# Patient Record
Sex: FEMALE | Race: OTHER | HISPANIC OR LATINO | Employment: UNEMPLOYED | URBAN - METROPOLITAN AREA
[De-identification: names, ages, dates, MRNs, and addresses within clinical notes are randomized per-mention and may not be internally consistent; named-entity substitution may affect disease eponyms.]

---

## 2023-08-18 LAB — CHORIOGONADOTROPIN (MIU/ML) IN SER/PLAS: 1637 MIU/ML

## 2023-08-21 LAB — CHORIOGONADOTROPIN (MIU/ML) IN SER/PLAS: 1944 MIU/ML

## 2023-08-30 LAB — CHORIOGONADOTROPIN (MIU/ML) IN SER/PLAS: 215 MIU/ML

## 2023-09-07 LAB — CHORIOGONADOTROPIN (MIU/ML) IN SER/PLAS: 130 MIU/ML

## 2023-09-20 LAB — CHORIOGONADOTROPIN (MIU/ML) IN SER/PLAS: 27 MIU/ML

## 2024-02-26 ENCOUNTER — OFFICE VISIT (OUTPATIENT)
Dept: OBSTETRICS AND GYNECOLOGY | Facility: CLINIC | Age: 44
End: 2024-02-26
Payer: COMMERCIAL

## 2024-02-26 ENCOUNTER — LAB (OUTPATIENT)
Dept: LAB | Facility: LAB | Age: 44
End: 2024-02-26
Payer: COMMERCIAL

## 2024-02-26 VITALS
HEIGHT: 67 IN | SYSTOLIC BLOOD PRESSURE: 110 MMHG | WEIGHT: 215 LBS | DIASTOLIC BLOOD PRESSURE: 76 MMHG | BODY MASS INDEX: 33.74 KG/M2

## 2024-02-26 DIAGNOSIS — Z34.91 FIRST TRIMESTER PREGNANCY (HHS-HCC): ICD-10-CM

## 2024-02-26 DIAGNOSIS — O36.80X0 ULTRASOUND SCAN TO CONFIRM FETAL VIABILITY WITH HISTORY OF MISCARRIAGE (HHS-HCC): ICD-10-CM

## 2024-02-26 DIAGNOSIS — Z87.59 ULTRASOUND SCAN TO CONFIRM FETAL VIABILITY WITH HISTORY OF MISCARRIAGE (HHS-HCC): ICD-10-CM

## 2024-02-26 DIAGNOSIS — O09.521 AMA (ADVANCED MATERNAL AGE) MULTIGRAVIDA 35+, FIRST TRIMESTER (HHS-HCC): ICD-10-CM

## 2024-02-26 DIAGNOSIS — Z32.01 PREGNANCY TEST POSITIVE (HHS-HCC): ICD-10-CM

## 2024-02-26 DIAGNOSIS — N91.1 AMENORRHEA, SECONDARY: Primary | ICD-10-CM

## 2024-02-26 LAB
ABO GROUP (TYPE) IN BLOOD: NORMAL
ANTIBODY SCREEN: NORMAL
CRL: 41 MM
ERYTHROCYTE [DISTWIDTH] IN BLOOD BY AUTOMATED COUNT: 13.9 % (ref 11.5–14.5)
HCT VFR BLD AUTO: 37.8 % (ref 36–46)
HGB BLD-MCNC: 12.1 G/DL (ref 12–16)
MCH RBC QN AUTO: 29.7 PG (ref 26–34)
MCHC RBC AUTO-ENTMCNC: 32 G/DL (ref 32–36)
MCV RBC AUTO: 93 FL (ref 80–100)
NRBC BLD-RTO: 0 /100 WBCS (ref 0–0)
PLATELET # BLD AUTO: 359 X10*3/UL (ref 150–450)
PREGNANCY TEST URINE, POC: POSITIVE
RBC # BLD AUTO: 4.08 X10*6/UL (ref 4–5.2)
RH FACTOR (ANTIGEN D): NORMAL
WBC # BLD AUTO: 10.5 X10*3/UL (ref 4.4–11.3)

## 2024-02-26 PROCEDURE — 86850 RBC ANTIBODY SCREEN: CPT

## 2024-02-26 PROCEDURE — 86803 HEPATITIS C AB TEST: CPT

## 2024-02-26 PROCEDURE — 86780 TREPONEMA PALLIDUM: CPT

## 2024-02-26 PROCEDURE — 87340 HEPATITIS B SURFACE AG IA: CPT

## 2024-02-26 PROCEDURE — 87389 HIV-1 AG W/HIV-1&-2 AB AG IA: CPT

## 2024-02-26 PROCEDURE — 76817 TRANSVAGINAL US OBSTETRIC: CPT | Performed by: OBSTETRICS & GYNECOLOGY

## 2024-02-26 PROCEDURE — 99214 OFFICE O/P EST MOD 30 MIN: CPT | Performed by: OBSTETRICS & GYNECOLOGY

## 2024-02-26 PROCEDURE — 83036 HEMOGLOBIN GLYCOSYLATED A1C: CPT

## 2024-02-26 PROCEDURE — 86901 BLOOD TYPING SEROLOGIC RH(D): CPT

## 2024-02-26 PROCEDURE — 86317 IMMUNOASSAY INFECTIOUS AGENT: CPT

## 2024-02-26 PROCEDURE — 36415 COLL VENOUS BLD VENIPUNCTURE: CPT

## 2024-02-26 PROCEDURE — 81025 URINE PREGNANCY TEST: CPT | Performed by: OBSTETRICS & GYNECOLOGY

## 2024-02-26 PROCEDURE — 85027 COMPLETE CBC AUTOMATED: CPT

## 2024-02-26 PROCEDURE — 86900 BLOOD TYPING SEROLOGIC ABO: CPT

## 2024-02-26 PROCEDURE — 1036F TOBACCO NON-USER: CPT | Performed by: OBSTETRICS & GYNECOLOGY

## 2024-02-26 ASSESSMENT — ENCOUNTER SYMPTOMS: FATIGUE: 1

## 2024-02-26 NOTE — PROGRESS NOTES
"Subjective   Janett Ray is a 43 y.o. female who is here for a pregnancy confirmation visit.    LMP 23  Expected GA by LMP: 10w6d  Expected SISSY by LMP: 24    She was last seen for a SAB in 2023.    She is feeling ok. Mostly just tired.  No vaginal bleeding.  No significant nausea.     x1. PLTCS x1 for fetal macrosomia.   H/O GDM, GHTN.   She is AMA.     Review of Systems   Constitutional:  Positive for fatigue.   All other systems reviewed and are negative.      Objective   /76   Ht 1.702 m (5' 7\")   Wt 97.5 kg (215 lb)   LMP 2023 (Exact Date)   BMI 33.67 kg/m²     Constitutional: Alert and in no acute distress.   Pulmonary: No respiratory distress.   Abdomen: Soft, nontender; no abdominal mass palpated.   Genitourinary:   External genitalia: Normal appearance.    Vagina: Normal.   Cervix: Normal appearance, nontender.   Uterus/Adnexa: Uterus nontender, no adnexal masses  Psychiatric: Alert and oriented x 3. Affect normal to patient's baseline. Mood: Appropriate.     Ultrasound: Transvaginal ultrasound performed. There is a single, viable IUP identified. The fetus measures 11w0d by CRL, this is consistent with her LMP. Normal fetal cardiac activity. No gross anomalies of the fetus, uterus or adnexa seen.    Assessment and Plan:    1. Amenorrhea, secondary  2. Pregnancy test positive  - POCT pregnancy, urine manually resulted  - US OB transvaginal  3. Ultrasound scan to confirm fetal viability with history of miscarriage  - US OB transvaginal  4. First trimester pregnancy  - CBC Anemia Panel With Reflex,Pregnancy; Future  - Hepatitis B Surface Antigen; Future  - Hepatitis C Antibody; Future  - HIV 1/2 Antigen/Antibody Screen with Reflex to Confirmation; Future  - Rubella Antibody, IgG; Future  - Syphilis Screen with Reflex; Future  - Type And Screen; Future  - Hemoglobin A1C; Future  - Myriad Prequel Prenatal Screen; Future  5. AMA (advanced maternal age) multigravida 35+, " first trimester  6. History of GDM    A viable pregnancy is confirmed today by ultrasound. Fetal measurement is consistent with the expected dating from her LMP.  She will continue prenatal vitamins.  Orders given today for prenatal blood work.  HgbA1C screening also ordered due to her h/o GMD and fetal macrosomia.  She is advanced maternal age. Discussed genetic screening testing. She wants to have the NIPT Prequel test done, order for this given today too.  She should follow up in 2-3 weeks for her next OB visit or call sooner for any problems.

## 2024-02-27 LAB
EST. AVERAGE GLUCOSE BLD GHB EST-MCNC: 100 MG/DL
HBA1C MFR BLD: 5.1 %
HBV SURFACE AG SERPL QL IA: NONREACTIVE
HCV AB SER QL: NONREACTIVE
HIV 1+2 AB+HIV1 P24 AG SERPL QL IA: NONREACTIVE
REFLEX ADDED, ANEMIA PANEL: NORMAL
RUBV IGG SERPL IA-ACNC: 1.3 IA
RUBV IGG SERPL QL IA: POSITIVE
TREPONEMA PALLIDUM IGG+IGM AB [PRESENCE] IN SERUM OR PLASMA BY IMMUNOASSAY: NONREACTIVE

## 2024-03-11 ENCOUNTER — TELEPHONE (OUTPATIENT)
Dept: OBSTETRICS AND GYNECOLOGY | Facility: CLINIC | Age: 44
End: 2024-03-11
Payer: COMMERCIAL

## 2024-03-11 DIAGNOSIS — Z34.91 FIRST TRIMESTER PREGNANCY (HHS-HCC): Primary | ICD-10-CM

## 2024-03-14 LAB — SCAN RESULT: NORMAL

## 2024-03-20 ENCOUNTER — INITIAL PRENATAL (OUTPATIENT)
Dept: OBSTETRICS AND GYNECOLOGY | Facility: CLINIC | Age: 44
End: 2024-03-20
Payer: COMMERCIAL

## 2024-03-20 VITALS — WEIGHT: 222 LBS | BODY MASS INDEX: 34.77 KG/M2 | SYSTOLIC BLOOD PRESSURE: 126 MMHG | DIASTOLIC BLOOD PRESSURE: 84 MMHG

## 2024-03-20 DIAGNOSIS — O09.522 AMA (ADVANCED MATERNAL AGE) MULTIGRAVIDA 35+, SECOND TRIMESTER (HHS-HCC): ICD-10-CM

## 2024-03-20 DIAGNOSIS — Z12.4 CERVICAL CANCER SCREENING: ICD-10-CM

## 2024-03-20 DIAGNOSIS — Z3A.14 14 WEEKS GESTATION OF PREGNANCY (HHS-HCC): ICD-10-CM

## 2024-03-20 DIAGNOSIS — Z34.82 PRENATAL CARE, SUBSEQUENT PREGNANCY, SECOND TRIMESTER (HHS-HCC): Primary | ICD-10-CM

## 2024-03-20 DIAGNOSIS — Z36.89 SCREENING, ANTENATAL, FOR FETAL ANATOMIC SURVEY (HHS-HCC): ICD-10-CM

## 2024-03-20 LAB
POC GLUCOSE, URINE: NEGATIVE MG/DL
POC PROTEIN, URINE: NEGATIVE MG/DL

## 2024-03-20 PROCEDURE — 87086 URINE CULTURE/COLONY COUNT: CPT

## 2024-03-20 PROCEDURE — 87624 HPV HI-RISK TYP POOLED RSLT: CPT

## 2024-03-20 PROCEDURE — 88175 CYTOPATH C/V AUTO FLUID REDO: CPT

## 2024-03-20 PROCEDURE — 0500F INITIAL PRENATAL CARE VISIT: CPT | Performed by: OBSTETRICS & GYNECOLOGY

## 2024-03-20 PROCEDURE — 87800 DETECT AGNT MULT DNA DIREC: CPT

## 2024-03-20 NOTE — PROGRESS NOTES
Subjective   Patient ID [id number]   Janett Ray is a 43 y.o.  at 14w1d with a working estimated date of delivery of 2024, by Last Menstrual Period who presents for an initial prenatal visit. This pregnancy is planned.    Her pregnancy is complicated by:  AMA  Prior LTCS x 1  H/o GDM  H/o GHTN    OB History    Para Term  AB Living   5 2 2   2 2   SAB IAB Ectopic Multiple Live Births   2       2      # Outcome Date GA Lbr Luiz/2nd Weight Sex Delivery Anes PTL Lv   5 Current            4 2023           3 2022           2 Term 19   4.536 kg M CS-LTranv Spinal N SINDHU      Complications: Gestational diabetes   1 Term 06   2.722 kg F Vag-Spont None N SINDHU          Objective   Physical Exam  Weight: 101 kg (222 lb)  Expected Total Weight Gain: 5 kg (11 lb)-9 kg (19 lb)   Pregravid BMI: 33.67  BP: 126/84    Fetal Heart Rate: 155   Abdominal ultrasound: Single, viable IUP measuring 14.3 weeks.    OBGyn Exam  Constitutional: Alert and in no acute distress.     Pulmonary: No respiratory distress.     Chest: Breasts: Normal appearance, no nipple discharge, and no skin changes. Palpation of breasts and axillae: No palpable mass and no axillary lymphadenopathy.    Abdomen: Soft, nontender; no abdominal mass palpated.     Genitourinary:   External genitalia: Normal appearance.  No inguinal lymphadenopathy.   Bartholin's, Urethral, and Skenes Glands: Normal.   Urethra: Normal. Bladder: Normal on palpation.   Vagina: Normal.   Cervix: Normal appearance, nontender.   Uterus/Adnexa: Nontender, no masses palpated in adnexa  Inspection of perianal area: Normal.    Musculoskeletal: No joint swelling seen, normal movements of all extremities.    Skin: Normal skin color and pigmentation, normal skin turgor, and no rash.    Psychiatric: Alert and oriented x 3. Affect normal to patient's baseline. Mood: Appropriate.    Assessment/Plan   Diagnoses and all orders for this visit:  Prenatal care,  subsequent pregnancy, second trimester  14 weeks gestation of pregnancy  -     POCT UA Automated manually resulted  -     Urine Culture  -     C. Trachomatis / N. Gonorrhoeae, Amplified Detection  AMA (advanced maternal age) multigravida 35+, second trimester  Cervical cancer screening  -     THINPREP PAP    Routine prenatal labs have already been done and are normal.  Early HgbA1C normal.  Prequel NIPT done; risk reducing, XY   Order placed for her anatomy ultrasound so that she can schedule this.  She was advised on the use of baby aspirin daily in addition to prenatal vitamins.  Follow up in 4 weeks for return OB visit.

## 2024-03-21 LAB
BACTERIA UR CULT: NORMAL
C TRACH RRNA SPEC QL NAA+PROBE: NEGATIVE
N GONORRHOEA DNA SPEC QL PROBE+SIG AMP: NEGATIVE

## 2024-03-25 RX ORDER — PNV NO.95/FERROUS FUM/FOLIC AC 28MG-0.8MG
TABLET ORAL
Qty: 90 TABLET | Refills: 3 | Status: SHIPPED | OUTPATIENT
Start: 2024-03-25

## 2024-03-28 ENCOUNTER — TELEPHONE (OUTPATIENT)
Dept: OBSTETRICS AND GYNECOLOGY | Facility: CLINIC | Age: 44
End: 2024-03-28
Payer: COMMERCIAL

## 2024-03-29 LAB
CYTOLOGY CMNT CVX/VAG CYTO-IMP: NORMAL
HPV HR 12 DNA GENITAL QL NAA+PROBE: NEGATIVE
HPV HR GENOTYPES PNL CVX NAA+PROBE: NEGATIVE
HPV16 DNA SPEC QL NAA+PROBE: NEGATIVE
HPV18 DNA SPEC QL NAA+PROBE: NEGATIVE
LAB AP HPV GENOTYPE QUESTION: YES
LAB AP HPV HR: NORMAL
LABORATORY COMMENT REPORT: NORMAL
LMP START DATE: NORMAL
MENSTRUAL HX REPORTED: NORMAL
PATH REPORT.TOTAL CANCER: NORMAL

## 2024-04-18 ENCOUNTER — ROUTINE PRENATAL (OUTPATIENT)
Dept: OBSTETRICS AND GYNECOLOGY | Facility: CLINIC | Age: 44
End: 2024-04-18
Payer: COMMERCIAL

## 2024-04-18 VITALS — SYSTOLIC BLOOD PRESSURE: 108 MMHG | BODY MASS INDEX: 35.77 KG/M2 | WEIGHT: 228.4 LBS | DIASTOLIC BLOOD PRESSURE: 82 MMHG

## 2024-04-18 DIAGNOSIS — Z34.82 MULTIGRAVIDA IN SECOND TRIMESTER (HHS-HCC): Primary | ICD-10-CM

## 2024-04-18 DIAGNOSIS — Z3A.18 18 WEEKS GESTATION OF PREGNANCY (HHS-HCC): ICD-10-CM

## 2024-04-18 DIAGNOSIS — Z87.59 HISTORY OF GESTATIONAL HYPERTENSION: ICD-10-CM

## 2024-04-18 LAB
POC APPEARANCE, URINE: CLEAR
POC BLOOD, URINE: ABNORMAL
POC COLOR, URINE: YELLOW
POC GLUCOSE, URINE: NEGATIVE MG/DL
POC KETONES, URINE: NEGATIVE MG/DL
POC LEUKOCYTES, URINE: NEGATIVE
POC NITRITE,URINE: NEGATIVE
POC PROTEIN, URINE: NEGATIVE MG/DL

## 2024-04-18 PROCEDURE — 84156 ASSAY OF PROTEIN URINE: CPT

## 2024-04-18 PROCEDURE — 82570 ASSAY OF URINE CREATININE: CPT

## 2024-04-18 PROCEDURE — 0501F PRENATAL FLOW SHEET: CPT | Performed by: OBSTETRICS & GYNECOLOGY

## 2024-04-18 NOTE — PATIENT INSTRUCTIONS
You were seen in the office today for routine OB care and had normal findings on exam  Continue routine OB precautions at home  Continue taking prenatal vitamins   Avoid sick contacts and consider getting your Flu (available in office during flu season) and COVID vaccines to protect against infection in pregnancy  You will be given an order for your anatomy ultrasound. Please schedule at [facility] OB imaging between 19 and 22 weeks gestation [phone]  You will be given a bottle of Glucola and orders for your second trimester labs. Please complete these between 25 and 28 weeks gestation. You may complete these at any  outpatient lab, and do not need an appointment  Make an appointment for routine care in the office in the next 4 weeks  If you are having any concerns prior to your next visit please call the office to speak to the physician on call. This includes after hours, weekends, and holidays, when the answering service will be able to connect you with the physician on call. [phone] ([place]) or [phone] [facility].

## 2024-04-18 NOTE — PROGRESS NOTES
Subjective   Patient ID [id number]   Janett Ray is a 43 y.o.  at 18w2d with a working estimated date of delivery of 2024, by Last Menstrual Period who presents for a routine prenatal visit. She denies vaginal bleeding, leakage of fluid, decreased fetal movements, or contractions.    Her pregnancy is complicated by:  Obesity  Hx GHTN  Hx GDM  Hx macrosomia  Hx LTCS  AMA    Objective   Physical Exam  Weight: 104 kg (228 lb 6.4 oz)  Expected Total Weight Gain: 5 kg (11 lb)-9 kg (19 lb)   Pregravid BMI: 33.67  BP: 108/82         Prenatal Labs  Urine dip:  Lab Results   Component Value Date    KETONESU NEGATIVE 2024       Lab Results   Component Value Date    HGB 12.1 2024    HCT 37.8 2024    ABO O 2024    HEPBSAG Nonreactive 2024           Imaging: anatomy US scheduled for later this month    Assessment/Plan   Problem List Items Addressed This Visit    None  Visit Diagnoses         Codes    Multigravida in second trimester ([facility])    -  Primary Z34.82    Patient doing well  Anatomy US later this month  Precautions given  RTO 4 weeks     18 weeks gestation of pregnancy ([facility])     Z3A.18    Relevant Orders    POCT UA Automated manually resulted (Completed)    History of gestational hypertension     Z87.59    HELLP labs/UPCr ordered  Patient taking  mg/day     Relevant Orders    CBC Anemia Panel With Reflex, Pregnancy    Comprehensive Metabolic Panel    Lactate Dehydrogenase    Uric Acid    Protein, Urine Random (P:C Ratio)            Continue prenatal vitamin.  Labs reviewed.  Rhogam not indicated  GTT 25-28 weeks.    Follow up in 4 weeks for a routine prenatal visit.  Avelina Huynh DO

## 2024-04-19 LAB
CREAT UR-MCNC: 71.7 MG/DL (ref 20–320)
PROT UR-ACNC: 4 MG/DL (ref 5–24)
PROT/CREAT UR: 0.06 MG/MG CREAT (ref 0–0.17)

## 2024-04-22 ENCOUNTER — HOSPITAL ENCOUNTER (OUTPATIENT)
Dept: RADIOLOGY | Facility: CLINIC | Age: 44
Discharge: HOME | End: 2024-04-22
Payer: COMMERCIAL

## 2024-04-22 DIAGNOSIS — Z36.89 SCREENING, ANTENATAL, FOR FETAL ANATOMIC SURVEY (HHS-HCC): ICD-10-CM

## 2024-04-22 DIAGNOSIS — O35.9XX0 MATERNAL CARE FOR (SUSPECTED) FETAL ABNORMALITY AND DAMAGE, UNSPECIFIED, NOT APPLICABLE OR UNSPECIFIED (HHS-HCC): ICD-10-CM

## 2024-04-22 DIAGNOSIS — Z34.82 PRENATAL CARE, SUBSEQUENT PREGNANCY, SECOND TRIMESTER (HHS-HCC): ICD-10-CM

## 2024-04-22 PROCEDURE — 76811 OB US DETAILED SNGL FETUS: CPT | Performed by: MEDICAL GENETICS

## 2024-04-22 PROCEDURE — 76811 OB US DETAILED SNGL FETUS: CPT

## 2024-05-15 ENCOUNTER — HOSPITAL ENCOUNTER (OUTPATIENT)
Dept: RADIOLOGY | Facility: CLINIC | Age: 44
Discharge: HOME | End: 2024-05-15
Payer: COMMERCIAL

## 2024-05-15 DIAGNOSIS — Z36.89 SCREENING, ANTENATAL, FOR FETAL ANATOMIC SURVEY (HHS-HCC): ICD-10-CM

## 2024-05-15 DIAGNOSIS — Z34.82 PRENATAL CARE, SUBSEQUENT PREGNANCY, SECOND TRIMESTER (HHS-HCC): ICD-10-CM

## 2024-05-15 PROCEDURE — 76816 OB US FOLLOW-UP PER FETUS: CPT

## 2024-05-15 PROCEDURE — 76816 OB US FOLLOW-UP PER FETUS: CPT | Performed by: OBSTETRICS & GYNECOLOGY

## 2024-05-15 NOTE — PROGRESS NOTES
Subjective   Patient ID [id number]   Janett Ray is a 44 y.o.  at 22w2d ,+FM. No cramping/VB/LOF.    Her pregnancy is complicated by:  Pregravid BMI 33  Hx GHTN  Hx GDM  Hx macrosomia  Hx LTCS  AMA    Objective   Physical Exam  Weight: 105 kg (231 lb)  BP: 122/74  Fetal Heart Rate: 140 Fundal Height (cm): 24 cm    Lab Results   Component Value Date    HGB 12.1 2024    HCT 37.8 2024       Assessment/Plan   Glucola given.   Follow up in 4 weeks for a routine prenatal visit.

## 2024-05-16 ENCOUNTER — LAB (OUTPATIENT)
Dept: LAB | Facility: LAB | Age: 44
End: 2024-05-16
Payer: COMMERCIAL

## 2024-05-16 ENCOUNTER — ROUTINE PRENATAL (OUTPATIENT)
Dept: OBSTETRICS AND GYNECOLOGY | Facility: CLINIC | Age: 44
End: 2024-05-16
Payer: COMMERCIAL

## 2024-05-16 VITALS — DIASTOLIC BLOOD PRESSURE: 74 MMHG | SYSTOLIC BLOOD PRESSURE: 122 MMHG | BODY MASS INDEX: 36.18 KG/M2 | WEIGHT: 231 LBS

## 2024-05-16 DIAGNOSIS — Z87.59 HISTORY OF GESTATIONAL HYPERTENSION: ICD-10-CM

## 2024-05-16 DIAGNOSIS — Z34.80 SUPERVISION OF OTHER NORMAL PREGNANCY, ANTEPARTUM (HHS-HCC): ICD-10-CM

## 2024-05-16 LAB
ALBUMIN SERPL BCP-MCNC: 3.6 G/DL (ref 3.4–5)
ALP SERPL-CCNC: 51 U/L (ref 33–110)
ALT SERPL W P-5'-P-CCNC: 12 U/L (ref 7–45)
ANION GAP SERPL CALC-SCNC: 11 MMOL/L (ref 10–20)
AST SERPL W P-5'-P-CCNC: 14 U/L (ref 9–39)
BILIRUB SERPL-MCNC: 0.2 MG/DL (ref 0–1.2)
BUN SERPL-MCNC: 10 MG/DL (ref 6–23)
CALCIUM SERPL-MCNC: 9 MG/DL (ref 8.6–10.3)
CHLORIDE SERPL-SCNC: 104 MMOL/L (ref 98–107)
CO2 SERPL-SCNC: 28 MMOL/L (ref 21–32)
CREAT SERPL-MCNC: 0.68 MG/DL (ref 0.5–1.05)
EGFRCR SERPLBLD CKD-EPI 2021: >90 ML/MIN/1.73M*2
ERYTHROCYTE [DISTWIDTH] IN BLOOD BY AUTOMATED COUNT: 14.7 % (ref 11.5–14.5)
GLUCOSE SERPL-MCNC: 75 MG/DL (ref 74–99)
HCT VFR BLD AUTO: 36.5 % (ref 36–46)
HGB BLD-MCNC: 11.3 G/DL (ref 12–16)
LDH SERPL L TO P-CCNC: 144 U/L (ref 84–246)
MCH RBC QN AUTO: 29 PG (ref 26–34)
MCHC RBC AUTO-ENTMCNC: 31 G/DL (ref 32–36)
MCV RBC AUTO: 94 FL (ref 80–100)
NRBC BLD-RTO: 0 /100 WBCS (ref 0–0)
PLATELET # BLD AUTO: 284 X10*3/UL (ref 150–450)
POC APPEARANCE, URINE: CLEAR
POC BILIRUBIN, URINE: NEGATIVE
POC BLOOD, URINE: NEGATIVE
POC COLOR, URINE: YELLOW
POC GLUCOSE, URINE: NEGATIVE MG/DL
POC KETONES, URINE: NEGATIVE MG/DL
POC LEUKOCYTES, URINE: NEGATIVE
POC NITRITE,URINE: NEGATIVE
POC PH, URINE: 5 PH
POC PROTEIN, URINE: NEGATIVE MG/DL
POC SPECIFIC GRAVITY, URINE: 1.02
POC UROBILINOGEN, URINE: 0.2 EU/DL
POTASSIUM SERPL-SCNC: 3.5 MMOL/L (ref 3.5–5.3)
PROT SERPL-MCNC: 6.1 G/DL (ref 6.4–8.2)
RBC # BLD AUTO: 3.89 X10*6/UL (ref 4–5.2)
SODIUM SERPL-SCNC: 139 MMOL/L (ref 136–145)
URATE SERPL-MCNC: 4.6 MG/DL (ref 2.3–6.7)
WBC # BLD AUTO: 10.3 X10*3/UL (ref 4.4–11.3)

## 2024-05-16 PROCEDURE — 0501F PRENATAL FLOW SHEET: CPT | Performed by: OBSTETRICS & GYNECOLOGY

## 2024-05-16 PROCEDURE — 36415 COLL VENOUS BLD VENIPUNCTURE: CPT

## 2024-05-16 PROCEDURE — 80053 COMPREHEN METABOLIC PANEL: CPT

## 2024-05-16 PROCEDURE — 84550 ASSAY OF BLOOD/URIC ACID: CPT

## 2024-05-16 PROCEDURE — 83615 LACTATE (LD) (LDH) ENZYME: CPT

## 2024-05-16 PROCEDURE — 85027 COMPLETE CBC AUTOMATED: CPT

## 2024-05-16 RX ORDER — ASPIRIN 81 MG/1
162 TABLET ORAL DAILY
COMMUNITY

## 2024-05-17 LAB — REFLEX ADDED, ANEMIA PANEL: NORMAL

## 2024-06-12 ENCOUNTER — LAB (OUTPATIENT)
Dept: LAB | Facility: LAB | Age: 44
End: 2024-06-12
Payer: COMMERCIAL

## 2024-06-12 DIAGNOSIS — Z34.80 SUPERVISION OF OTHER NORMAL PREGNANCY, ANTEPARTUM (HHS-HCC): ICD-10-CM

## 2024-06-12 LAB
ERYTHROCYTE [DISTWIDTH] IN BLOOD BY AUTOMATED COUNT: 14.5 % (ref 11.5–14.5)
GLUCOSE 1H P 50 G GLC PO SERPL-MCNC: 183 MG/DL
HCT VFR BLD AUTO: 38.6 % (ref 36–46)
HGB BLD-MCNC: 11.9 G/DL (ref 12–16)
MCH RBC QN AUTO: 28.7 PG (ref 26–34)
MCHC RBC AUTO-ENTMCNC: 30.8 G/DL (ref 32–36)
MCV RBC AUTO: 93 FL (ref 80–100)
NRBC BLD-RTO: 0 /100 WBCS (ref 0–0)
PLATELET # BLD AUTO: 342 X10*3/UL (ref 150–450)
RBC # BLD AUTO: 4.14 X10*6/UL (ref 4–5.2)
REFLEX ADDED, ANEMIA PANEL: NORMAL
WBC # BLD AUTO: 9.3 X10*3/UL (ref 4.4–11.3)

## 2024-06-12 PROCEDURE — 36415 COLL VENOUS BLD VENIPUNCTURE: CPT

## 2024-06-13 ENCOUNTER — TELEPHONE (OUTPATIENT)
Dept: OBSTETRICS AND GYNECOLOGY | Facility: CLINIC | Age: 44
End: 2024-06-13

## 2024-06-13 ENCOUNTER — APPOINTMENT (OUTPATIENT)
Dept: OBSTETRICS AND GYNECOLOGY | Facility: CLINIC | Age: 44
End: 2024-06-13
Payer: COMMERCIAL

## 2024-06-13 VITALS — DIASTOLIC BLOOD PRESSURE: 80 MMHG | BODY MASS INDEX: 35.87 KG/M2 | WEIGHT: 229 LBS | SYSTOLIC BLOOD PRESSURE: 118 MMHG

## 2024-06-13 DIAGNOSIS — R73.09 GLUCOSE TOLERANCE TEST ABNORMAL: ICD-10-CM

## 2024-06-13 DIAGNOSIS — Z34.80 SUPERVISION OF OTHER NORMAL PREGNANCY, ANTEPARTUM (HHS-HCC): Primary | ICD-10-CM

## 2024-06-13 LAB
POC APPEARANCE, URINE: CLEAR
POC BILIRUBIN, URINE: NEGATIVE
POC BLOOD, URINE: NEGATIVE
POC COLOR, URINE: YELLOW
POC GLUCOSE, URINE: NEGATIVE MG/DL
POC KETONES, URINE: NEGATIVE MG/DL
POC LEUKOCYTES, URINE: NEGATIVE
POC NITRITE,URINE: NEGATIVE
POC PH, URINE: 6 PH
POC PROTEIN, URINE: NEGATIVE MG/DL
POC SPECIFIC GRAVITY, URINE: 1.01
POC UROBILINOGEN, URINE: 0.2 EU/DL

## 2024-06-13 PROCEDURE — 0501F PRENATAL FLOW SHEET: CPT | Performed by: OBSTETRICS & GYNECOLOGY

## 2024-06-13 NOTE — PROGRESS NOTES
Prenatal Visit    Patient presents for routine prenatal visit.   Feeling ok.  Upset because she failed her glucose testing.  Baby is moving. No abdominal pain. No bleeding. No leaking fluid.    Objective   Physical Exam:   Weight: 104 kg (229 lb)  Expected Total Weight Gain: 5 kg (11 lb)-9 kg (19 lb)   Pregravid BMI: 33.67  BP: 118/80  Fetal Heart Rate: 140 Fundal Height (cm): 38 cm             Assessment/Plan   1. Supervision of other normal pregnancy, antepartum ([facility])  - POCT UA (nonautomated) manually resulted    Reviewed abnormal 1 hour glucose screening and her risk factors for gestational diabetes (age, history of GDM in prior pregnancy). Stressed need to get 3 hour testing done. Reviewed dietary changes she can make.  Continue prenatal vitamins  Precautions given. Advised her to call for any concerns.  Follow up in 4 weeks.

## 2024-06-18 ENCOUNTER — LAB (OUTPATIENT)
Dept: LAB | Facility: LAB | Age: 44
End: 2024-06-18
Payer: COMMERCIAL

## 2024-06-18 DIAGNOSIS — R73.09 GLUCOSE TOLERANCE TEST ABNORMAL: ICD-10-CM

## 2024-06-18 LAB
GLUCOSE 1H P 100 G GLC PO SERPL-MCNC: 190 MG/DL
GLUCOSE 2H P 100 G GLC PO SERPL-MCNC: 152 MG/DL
GLUCOSE 3H P 100 G GLC PO SERPL-MCNC: 94 MG/DL
GLUCOSE P FAST SERPL-MCNC: 79 MG/DL

## 2024-06-18 PROCEDURE — 36415 COLL VENOUS BLD VENIPUNCTURE: CPT

## 2024-07-08 NOTE — PROGRESS NOTES
Janett Ray is a 44 y.o.  at 30w0d with a working estimated date of delivery of 2024, by Last Menstrual Period who presents for a routine prenatal visit.   No acute concerns.   +FM. No ctx, VB or LOF.    Patient doing well today with no concerns.    Her pregnancy is complicated by:   Pregravid BMI 33  Hx GHTN, GDM, macrosomia, LTCS  AMA    US pending this week.  Labs reviewed.  Abnormal one hour GCT and normal three hour GCT.    Follow up in 2 week for a routine prenatal visit.    Scribe Attestation  By signing my name below, I, Ricky Sharpe,   attest that this documentation has been prepared under the direction and in the presence of Edwin Staley MD.

## 2024-07-09 ENCOUNTER — APPOINTMENT (OUTPATIENT)
Dept: OBSTETRICS AND GYNECOLOGY | Facility: CLINIC | Age: 44
End: 2024-07-09
Payer: COMMERCIAL

## 2024-07-09 VITALS — DIASTOLIC BLOOD PRESSURE: 60 MMHG | BODY MASS INDEX: 36.34 KG/M2 | WEIGHT: 232 LBS | SYSTOLIC BLOOD PRESSURE: 116 MMHG

## 2024-07-09 DIAGNOSIS — Z34.80 SUPERVISION OF OTHER NORMAL PREGNANCY, ANTEPARTUM (HHS-HCC): Primary | ICD-10-CM

## 2024-07-09 LAB
POC BLOOD, URINE: NEGATIVE
POC GLUCOSE, URINE: NEGATIVE MG/DL
POC KETONES, URINE: NEGATIVE MG/DL
POC LEUKOCYTES, URINE: NEGATIVE
POC NITRITE,URINE: NEGATIVE
POC PROTEIN, URINE: NEGATIVE MG/DL

## 2024-07-09 PROCEDURE — 0501F PRENATAL FLOW SHEET: CPT | Performed by: OBSTETRICS & GYNECOLOGY

## 2024-07-10 ENCOUNTER — HOSPITAL ENCOUNTER (OUTPATIENT)
Dept: RADIOLOGY | Facility: CLINIC | Age: 44
Discharge: HOME | End: 2024-07-10
Payer: COMMERCIAL

## 2024-07-10 DIAGNOSIS — Z36.89 SCREENING, ANTENATAL, FOR FETAL ANATOMIC SURVEY (HHS-HCC): ICD-10-CM

## 2024-07-10 DIAGNOSIS — O36.5930 MATERNAL CARE FOR OTHER KNOWN OR SUSPECTED POOR FETAL GROWTH, THIRD TRIMESTER, NOT APPLICABLE OR UNSPECIFIED (HHS-HCC): ICD-10-CM

## 2024-07-10 DIAGNOSIS — Z34.82 PRENATAL CARE, SUBSEQUENT PREGNANCY, SECOND TRIMESTER (HHS-HCC): ICD-10-CM

## 2024-07-10 PROCEDURE — 76819 FETAL BIOPHYS PROFIL W/O NST: CPT

## 2024-07-10 PROCEDURE — 76816 OB US FOLLOW-UP PER FETUS: CPT | Performed by: OBSTETRICS & GYNECOLOGY

## 2024-07-10 PROCEDURE — 76819 FETAL BIOPHYS PROFIL W/O NST: CPT | Performed by: OBSTETRICS & GYNECOLOGY

## 2024-07-10 PROCEDURE — 76816 OB US FOLLOW-UP PER FETUS: CPT

## 2024-07-24 ENCOUNTER — APPOINTMENT (OUTPATIENT)
Dept: OBSTETRICS AND GYNECOLOGY | Facility: CLINIC | Age: 44
End: 2024-07-24
Payer: COMMERCIAL

## 2024-07-24 VITALS — BODY MASS INDEX: 36.02 KG/M2 | WEIGHT: 230 LBS | SYSTOLIC BLOOD PRESSURE: 114 MMHG | DIASTOLIC BLOOD PRESSURE: 80 MMHG

## 2024-07-24 DIAGNOSIS — Z34.80 SUPERVISION OF OTHER NORMAL PREGNANCY, ANTEPARTUM (HHS-HCC): ICD-10-CM

## 2024-07-24 LAB
POC APPEARANCE, URINE: CLEAR
POC BILIRUBIN, URINE: NEGATIVE
POC BLOOD, URINE: NEGATIVE
POC COLOR, URINE: YELLOW
POC GLUCOSE, URINE: NEGATIVE MG/DL
POC KETONES, URINE: NEGATIVE MG/DL
POC LEUKOCYTES, URINE: NEGATIVE
POC NITRITE,URINE: NEGATIVE
POC PH, URINE: 7.5 PH
POC PROTEIN, URINE: NEGATIVE MG/DL
POC SPECIFIC GRAVITY, URINE: 1.01
POC UROBILINOGEN, URINE: 0.2 EU/DL

## 2024-07-24 PROCEDURE — 0501F PRENATAL FLOW SHEET: CPT | Performed by: NURSE PRACTITIONER

## 2024-07-24 NOTE — PROGRESS NOTES
Janett Ray is a 44 y.o.  who presents at 32w1d with Estimated Date of Delivery: 24 for a routine prenatal visit.   She is feeling well. Baby is active.  Counseled and advised TDAP. She declines at this time but may want to do this at next appt.   Next growth US scheduled for 36 weeks.  Plan starting NSTs at 36 weeks for AMA    Her pregnancy is complicated by:  Obesity: Pregravid BMI 33  H/O GHTN, GDM, macrosomia w/PLTCS prior pregnancy  AMA    Objective   Fetal Heart Rate: 135  Fundal Height (cm): 34 cm  Movement: Present  BP: 114/80  Weight  Weight: 104 kg (230 lb)  Urine Dipstick  Urine Protein: NEGATIVE  Urine Leukocytes: NEGATIVE  Urine Ketone: NEGATIVE  Urine Glucose: NEGATIVE      Assessment/Plan   Diagnoses and all orders for this visit:  Supervision of other normal pregnancy, antepartum ([facility])  -     POCT UA (nonautomated) manually resulted  Precautions given  Follow up routine prenatal visit 2 weeks or sooner if needed.

## 2024-08-07 ENCOUNTER — TELEPHONE (OUTPATIENT)
Dept: OBSTETRICS AND GYNECOLOGY | Facility: CLINIC | Age: 44
End: 2024-08-07

## 2024-08-07 ENCOUNTER — APPOINTMENT (OUTPATIENT)
Dept: OBSTETRICS AND GYNECOLOGY | Facility: CLINIC | Age: 44
End: 2024-08-07
Payer: COMMERCIAL

## 2024-08-08 ENCOUNTER — ROUTINE PRENATAL (OUTPATIENT)
Dept: OBSTETRICS AND GYNECOLOGY | Facility: CLINIC | Age: 44
End: 2024-08-08
Payer: COMMERCIAL

## 2024-08-08 VITALS — WEIGHT: 234 LBS | BODY MASS INDEX: 36.65 KG/M2 | SYSTOLIC BLOOD PRESSURE: 120 MMHG | DIASTOLIC BLOOD PRESSURE: 84 MMHG

## 2024-08-08 DIAGNOSIS — O09.529 ANTEPARTUM MULTIGRAVIDA OF ADVANCED MATERNAL AGE (HHS-HCC): ICD-10-CM

## 2024-08-08 DIAGNOSIS — Z34.80 SUPERVISION OF OTHER NORMAL PREGNANCY, ANTEPARTUM (HHS-HCC): Primary | ICD-10-CM

## 2024-08-08 DIAGNOSIS — Z23 NEED FOR TDAP VACCINATION: ICD-10-CM

## 2024-08-08 PROCEDURE — 90715 TDAP VACCINE 7 YRS/> IM: CPT | Performed by: OBSTETRICS & GYNECOLOGY

## 2024-08-08 PROCEDURE — 90471 IMMUNIZATION ADMIN: CPT | Performed by: OBSTETRICS & GYNECOLOGY

## 2024-08-08 PROCEDURE — 0501F PRENATAL FLOW SHEET: CPT | Performed by: OBSTETRICS & GYNECOLOGY

## 2024-08-08 NOTE — PROGRESS NOTES
Subjective   Janett Ray is a 44 y.o.  at 34w2d, presents for a routine prenatal visit.   Feels well. 3 yo son with her today.  Discussed need for NST's  Growth ultrasound 2024.     Objective     /84   Wt 106 kg (234 lb)   LMP 2023 (Exact Date)   BMI 36.65 kg/m²     Lab Results   Component Value Date    HGB 11.9 (L) 2024    HCT 38.6 2024- ultrasound nl interval growth, EFW 60%ile.    Plan  1. Supervision of other normal pregnancy, antepartum ([facility])  - POCT UA (nonautomated) manually resulted    2. Need for Tdap vaccination  - Tdap vaccine, age 7 years and older ([facility])    3. Antepartum multigravida of advanced maternal age ([facility])  Start NST's pt cannot stay today and will schedule weekly NST's early next week.    RTO weekly          Katarzyna Levin MD

## 2024-08-14 ENCOUNTER — APPOINTMENT (OUTPATIENT)
Dept: OBSTETRICS AND GYNECOLOGY | Facility: CLINIC | Age: 44
End: 2024-08-14
Payer: COMMERCIAL

## 2024-08-14 VITALS — BODY MASS INDEX: 36.87 KG/M2 | DIASTOLIC BLOOD PRESSURE: 80 MMHG | WEIGHT: 235.4 LBS | SYSTOLIC BLOOD PRESSURE: 114 MMHG

## 2024-08-14 DIAGNOSIS — Z34.80 SUPERVISION OF OTHER NORMAL PREGNANCY, ANTEPARTUM (HHS-HCC): ICD-10-CM

## 2024-08-14 DIAGNOSIS — O09.521 AMA (ADVANCED MATERNAL AGE) MULTIGRAVIDA 35+, FIRST TRIMESTER (HHS-HCC): Primary | ICD-10-CM

## 2024-08-14 LAB
POC APPEARANCE, URINE: CLEAR
POC BLOOD, URINE: NEGATIVE
POC COLOR, URINE: YELLOW
POC GLUCOSE, URINE: NEGATIVE MG/DL
POC KETONES, URINE: NEGATIVE MG/DL
POC LEUKOCYTES, URINE: NEGATIVE
POC NITRITE,URINE: NEGATIVE
POC PROTEIN, URINE: NEGATIVE MG/DL

## 2024-08-14 PROCEDURE — 99213 OFFICE O/P EST LOW 20 MIN: CPT | Performed by: NURSE PRACTITIONER

## 2024-08-14 PROCEDURE — 81003 URINALYSIS AUTO W/O SCOPE: CPT | Performed by: NURSE PRACTITIONER

## 2024-08-14 PROCEDURE — 59025 FETAL NON-STRESS TEST: CPT | Performed by: NURSE PRACTITIONER

## 2024-08-14 NOTE — PROGRESS NOTES
Janett Ray is a  at 35w1d with a working estimated date of delivery of 2024, by Last Menstrual Period who presents for a routine prenatal visit.   She denies vaginal bleeding, leakage of fluid, or contractions.  She is feeling well. Baby is active.    Her pregnancy is complicated by:  AMA: Weekly NSTs. Reactive Category 1. Growth US scheduled for next week.   Obesity: Pregravid BMI 33  H/O GDM: Failed 1-hr, passed 3-hr  H/O PLTCS: Desires TOLAC  H/O GHTN: Bp normotensive      Objective   Physical Exam  Weight: 107 kg (235 lb 6.4 oz)  Expected Total Weight Gain: 5 kg (11 lb)-9 kg (19 lb)   Pregravid BMI: 33.67  BP: 114/80  Fetal Heart Rate: 140               Non-Stress Test   Baseline Fetal Heart Rate for Non-Stress Test: 140 BPM  Variability in Waveform for Non-Stress Test: Moderate  Accelerations in Non-Stress Test: Yes, greater than/equal to 15 bpm, lasting at least 15 seconds  Decelerations in Non-Stress Test: None  Contractions in Non-Stress Test: Not present  Acoustic Stimulator for Non-Stress Test: No  Interpretation of Non-Stress Test   Interpretation of Non-Stress Test: Reactive  Comments on Non-Stress Test: Category 1                             Assessment/Plan   Diagnoses and all orders for this visit:  Supervision of other normal pregnancy, antepartum ([facility])  -     POCT UA Automated manually resulted  AMA (advanced maternal age) multigravida 35+, first trimester ([facility])  -     Fetal nonstress test; Future  Precautions given.  Weekly NSTs  Follow up in 1 week(s) for a routine prenatal visit.

## 2024-08-14 NOTE — PROCEDURES
patricia Cole  at 35w1d with an SISSY of 2024, by Last Menstrual Period, was seen at [facility] for a nonstress test.    Non-Stress Test   Baseline Fetal Heart Rate for Non-Stress Test: 140 BPM  Variability in Waveform for Non-Stress Test: Moderate  Accelerations in Non-Stress Test: Yes, greater than/equal to 15 bpm, lasting at least 15 seconds  Decelerations in Non-Stress Test: None  Contractions in Non-Stress Test: Not present  Acoustic Stimulator for Non-Stress Test: No  Interpretation of Non-Stress Test   Interpretation of Non-Stress Test: Reactive  Comments on Non-Stress Test: Category 1

## 2024-08-22 ENCOUNTER — HOSPITAL ENCOUNTER (OUTPATIENT)
Dept: RADIOLOGY | Facility: CLINIC | Age: 44
Discharge: HOME | End: 2024-08-22
Payer: COMMERCIAL

## 2024-08-22 DIAGNOSIS — Z34.82 PRENATAL CARE, SUBSEQUENT PREGNANCY, SECOND TRIMESTER (HHS-HCC): ICD-10-CM

## 2024-08-22 DIAGNOSIS — Z36.89 SCREENING, ANTENATAL, FOR FETAL ANATOMIC SURVEY (HHS-HCC): ICD-10-CM

## 2024-08-22 PROCEDURE — 76819 FETAL BIOPHYS PROFIL W/O NST: CPT

## 2024-08-22 PROCEDURE — 76816 OB US FOLLOW-UP PER FETUS: CPT

## 2024-08-23 ENCOUNTER — APPOINTMENT (OUTPATIENT)
Dept: OBSTETRICS AND GYNECOLOGY | Facility: CLINIC | Age: 44
End: 2024-08-23
Payer: COMMERCIAL

## 2024-08-23 VITALS — DIASTOLIC BLOOD PRESSURE: 78 MMHG | BODY MASS INDEX: 37.12 KG/M2 | WEIGHT: 237 LBS | SYSTOLIC BLOOD PRESSURE: 120 MMHG

## 2024-08-23 DIAGNOSIS — Z34.80 SUPERVISION OF OTHER NORMAL PREGNANCY, ANTEPARTUM (HHS-HCC): ICD-10-CM

## 2024-08-23 LAB
POC APPEARANCE, URINE: CLEAR
POC BILIRUBIN, URINE: NEGATIVE
POC BLOOD, URINE: NEGATIVE
POC COLOR, URINE: NORMAL
POC GLUCOSE, URINE: NEGATIVE MG/DL
POC KETONES, URINE: NEGATIVE MG/DL
POC LEUKOCYTES, URINE: NEGATIVE
POC NITRITE,URINE: NEGATIVE
POC PH, URINE: 6.5 PH
POC PROTEIN, URINE: NEGATIVE MG/DL
POC SPECIFIC GRAVITY, URINE: 1.02
POC UROBILINOGEN, URINE: 0.2 EU/DL

## 2024-08-23 PROCEDURE — 87081 CULTURE SCREEN ONLY: CPT

## 2024-08-23 PROCEDURE — 0501F PRENATAL FLOW SHEET: CPT | Performed by: OBSTETRICS & GYNECOLOGY

## 2024-08-23 NOTE — PATIENT INSTRUCTIONS
You were seen in the office today for routine OB care and had normal findings on exam  Continue routine OB precautions at home  Continue taking prenatal vitamins   Avoid sick contacts and consider getting your Flu (available in office during flu season) and COVID vaccines to protect against infection in pregnancy  We recommend getting the Tdap (available in office) and RSV vaccines to pass some immunity from mother to baby and protect your baby against RSV and Whooping cough in the first few months of life. Tdap can be given between 27 and 36 weeks, and RSV vaccinations can be given between 32 and 36 weeks gestation  Make an appointment for routine care in the office in the next 2 weeks  If you are having any painful contractions, vaginal bleeding, leaking amniotic fluid, or decrease in baby's movements prior to your next visit please call the office to speak to the physician on call. This includes after hours, weekends, and holidays, when the answering service will be able to connect you with the physician on call. [phone] ([place] Office) or [phone] ([place] [facility]).

## 2024-08-23 NOTE — PROGRESS NOTES
Subjective   Patient ID [id number]   Janett Ray is a 44 y.o.  at 36w3d with a working estimated date of delivery of 2024, by Last Menstrual Period who presents for a routine prenatal visit. She denies vaginal bleeding, leakage of fluid, decreased fetal movements, or contractions.    Her pregnancy is complicated by:  LTCS x 1  Hx GDM  Hx GHTN  AMA    Objective   Physical Exam:   Weight: 108 kg (237 lb)  Expected Total Weight Gain: 5 kg (11 lb)-9 kg (19 lb)   Pregravid BMI: 33.67  BP: 120/78                  Prenatal Labs  Urine Dip:  Lab Results   Component Value Date    KETONESU NEGATIVE 2024     Lab Results   Component Value Date    HGB 11.9 (L) 2024    HCT 38.6 2024    ABO O 2024    HEPBSAG Nonreactive 2024           Imaging: vertex at 36 weeks    Assessment/Plan   Problem List Items Addressed This Visit             ICD-10-CM    Supervision of other normal pregnancy, antepartum ([facility]) Z34.80    Relevant Orders    POCT UA (nonautomated) manually resulted (Completed)     Continue prenatal vitamin.  Labs reviewed.  GBS taken.  Expected mode of delivery TOLAC  Follow up in 1 week for a routine prenatal visit.  Avelina Huynh DO

## 2024-08-25 LAB — GP B STREP GENITAL QL CULT: NORMAL

## 2024-08-26 LAB — GP B STREP GENITAL QL CULT: NORMAL

## 2024-08-29 ENCOUNTER — APPOINTMENT (OUTPATIENT)
Dept: OBSTETRICS AND GYNECOLOGY | Facility: CLINIC | Age: 44
End: 2024-08-29
Payer: COMMERCIAL

## 2024-08-29 VITALS — SYSTOLIC BLOOD PRESSURE: 112 MMHG | DIASTOLIC BLOOD PRESSURE: 72 MMHG | WEIGHT: 239 LBS | BODY MASS INDEX: 37.43 KG/M2

## 2024-08-29 DIAGNOSIS — O09.523 AMA (ADVANCED MATERNAL AGE) MULTIGRAVIDA 35+, THIRD TRIMESTER (HHS-HCC): ICD-10-CM

## 2024-08-29 DIAGNOSIS — Z34.80 SUPERVISION OF OTHER NORMAL PREGNANCY, ANTEPARTUM (HHS-HCC): Primary | ICD-10-CM

## 2024-08-29 LAB
POC APPEARANCE, URINE: CLEAR
POC BILIRUBIN, URINE: NEGATIVE
POC BLOOD, URINE: NEGATIVE
POC COLOR, URINE: YELLOW
POC GLUCOSE, URINE: NEGATIVE MG/DL
POC KETONES, URINE: NEGATIVE MG/DL
POC LEUKOCYTES, URINE: NEGATIVE
POC NITRITE,URINE: NEGATIVE
POC PH, URINE: 6.5 PH
POC PROTEIN, URINE: NEGATIVE MG/DL
POC SPECIFIC GRAVITY, URINE: 1.01
POC UROBILINOGEN, URINE: 0.2 EU/DL

## 2024-08-29 PROCEDURE — 59025 FETAL NON-STRESS TEST: CPT | Performed by: OBSTETRICS & GYNECOLOGY

## 2024-08-29 PROCEDURE — 99213 OFFICE O/P EST LOW 20 MIN: CPT | Performed by: OBSTETRICS & GYNECOLOGY

## 2024-08-29 PROCEDURE — 81002 URINALYSIS NONAUTO W/O SCOPE: CPT | Performed by: OBSTETRICS & GYNECOLOGY

## 2024-08-29 NOTE — PROCEDURES
patricia Cole  at 37w2d with an SISSY of 2024, by Last Menstrual Period, was seen at [facility] for a nonstress test.    Non-Stress Test   Baseline Fetal Heart Rate for Non-Stress Test: 145 BPM  Variability in Waveform for Non-Stress Test: Moderate  Accelerations in Non-Stress Test: Yes, lasting at least 15 seconds, greater than/equal to 15 bpm  Decelerations in Non-Stress Test: None  Contractions in Non-Stress Test: Not present  Acoustic Stimulator for Non-Stress Test: No  Interpretation of Non-Stress Test   Interpretation of Non-Stress Test: Reactive  Comments on Non-Stress Test: Reassuring, Category 1  NST for Multiple Fetuses: No

## 2024-08-29 NOTE — PROGRESS NOTES
Prenatal Visit    Patient presents for routine prenatal visit.   Feeling well.  Baby is moving. No regular contractions. No bleeding. No leaking fluid.    Objective   Physical Exam:   Weight: 108 kg (239 lb)  Expected Total Weight Gain: 5 kg (11 lb)-9 kg (19 lb)   Pregravid BMI: 33.67  BP: 112/72  Fetal Heart Rate: 145      Dilation: 1 Effacement (%): 50 Fetal Station: -3    Assessment/Plan   1. Supervision of other normal pregnancy, antepartum ([facility])  - POCT UA (nonautomated) manually resulted  2. AMA (advanced maternal age) multigravida 35+, third trimester ([facility])  - Fetal nonstress test    Doing well today.  NST is reactive.  Precautions given. Advised her to call for any signs/symptoms of labor or other concerns.  Follow up in 1 weeks.

## 2024-09-05 ENCOUNTER — APPOINTMENT (OUTPATIENT)
Dept: OBSTETRICS AND GYNECOLOGY | Facility: CLINIC | Age: 44
End: 2024-09-05
Payer: COMMERCIAL

## 2024-09-05 VITALS — SYSTOLIC BLOOD PRESSURE: 110 MMHG | DIASTOLIC BLOOD PRESSURE: 78 MMHG | WEIGHT: 238 LBS | BODY MASS INDEX: 37.28 KG/M2

## 2024-09-05 DIAGNOSIS — O09.523 AMA (ADVANCED MATERNAL AGE) MULTIGRAVIDA 35+, THIRD TRIMESTER (HHS-HCC): ICD-10-CM

## 2024-09-05 DIAGNOSIS — Z34.80 SUPERVISION OF OTHER NORMAL PREGNANCY, ANTEPARTUM (HHS-HCC): Primary | ICD-10-CM

## 2024-09-05 PROCEDURE — 59025 FETAL NON-STRESS TEST: CPT | Performed by: OBSTETRICS & GYNECOLOGY

## 2024-09-05 PROCEDURE — 81002 URINALYSIS NONAUTO W/O SCOPE: CPT | Performed by: OBSTETRICS & GYNECOLOGY

## 2024-09-05 PROCEDURE — 99213 OFFICE O/P EST LOW 20 MIN: CPT | Performed by: OBSTETRICS & GYNECOLOGY

## 2024-09-05 NOTE — PROGRESS NOTES
Subjective   Janett Ray is a 44 y.o.  at 38w2d, presents for a routine prenatal visit.   No complaints. Good fetal movement.    Objective     /78   Wt 108 kg (238 lb)   LMP 2023 (Exact Date)   BMI 37.28 kg/m²     Lab Results   Component Value Date    HGB 11.9 (L) 2024    HCT 38.6 2024     1cm/50%/-3/vtx    Non-Stress Test   Baseline Fetal Heart Rate for Non-Stress Test: 140 BPM  Variability in Waveform for Non-Stress Test: Moderate  Accelerations in Non-Stress Test: Yes  Decelerations in Non-Stress Test: None  Contractions in Non-Stress Test: Not present  Acoustic Stimulator for Non-Stress Test: No  Interpretation of Non-Stress Test   Interpretation of Non-Stress Test: Reactive  Comments on Non-Stress Test: reassuring, rective.  NST for Multiple Fetuses: No       Plan  1. Supervision of other normal pregnancy, antepartum ([facility])  38 2/7 weeks  - POCT UA (nonautomated) manually resulted    2. AMA (advanced maternal age) multigravida 35+, third trimester ([facility])  - Fetal nonstress test reactive and reassuring.    3. TOLAC planned. Signs and sx of labor.    RTO one week.     Katarzyna Levin MD

## 2024-09-12 ENCOUNTER — APPOINTMENT (OUTPATIENT)
Dept: OBSTETRICS AND GYNECOLOGY | Facility: CLINIC | Age: 44
End: 2024-09-12
Payer: COMMERCIAL

## 2024-09-12 VITALS — WEIGHT: 237 LBS | SYSTOLIC BLOOD PRESSURE: 128 MMHG | BODY MASS INDEX: 37.12 KG/M2 | DIASTOLIC BLOOD PRESSURE: 78 MMHG

## 2024-09-12 DIAGNOSIS — O09.523 AMA (ADVANCED MATERNAL AGE) MULTIGRAVIDA 35+, THIRD TRIMESTER (HHS-HCC): ICD-10-CM

## 2024-09-12 DIAGNOSIS — Z34.80 SUPERVISION OF OTHER NORMAL PREGNANCY, ANTEPARTUM (HHS-HCC): Primary | ICD-10-CM

## 2024-09-12 LAB
POC APPEARANCE, URINE: CLEAR
POC BILIRUBIN, URINE: NEGATIVE
POC BLOOD, URINE: NEGATIVE
POC COLOR, URINE: NORMAL
POC GLUCOSE, URINE: NEGATIVE MG/DL
POC KETONES, URINE: NEGATIVE MG/DL
POC LEUKOCYTES, URINE: NEGATIVE
POC NITRITE,URINE: NEGATIVE
POC PH, URINE: 6.5 PH
POC PROTEIN, URINE: NEGATIVE MG/DL
POC SPECIFIC GRAVITY, URINE: 1.01
POC UROBILINOGEN, URINE: 0.2 EU/DL

## 2024-09-12 PROCEDURE — 0501F PRENATAL FLOW SHEET: CPT | Performed by: OBSTETRICS & GYNECOLOGY

## 2024-09-12 PROCEDURE — 59025 FETAL NON-STRESS TEST: CPT | Performed by: OBSTETRICS & GYNECOLOGY

## 2024-09-12 NOTE — PROGRESS NOTES
Prenatal Visit    Patient presents for routine prenatal visit and NST.   Feeling well.  Baby is moving. No abdominal pain. No bleeding. No leaking fluid.    Objective   Physical Exam:   Weight: 108 kg (237 lb)  Expected Total Weight Gain: 5 kg (11 lb)-9 kg (19 lb)   Pregravid BMI: 33.67  BP: 128/78  Fetal Heart Rate: 150      Dilation: 1 Effacement (%): 50 Fetal Station: -3    Non-Stress Test   Baseline Fetal Heart Rate for Non-Stress Test: 150 BPM  Variability in Waveform for Non-Stress Test: Moderate  Accelerations in Non-Stress Test: Yes, greater than/equal to 15 bpm  Decelerations in Non-Stress Test: None  Contractions in Non-Stress Test: Not present  Acoustic Stimulator for Non-Stress Test: No  Interpretation of Non-Stress Test   Interpretation of Non-Stress Test: Reactive  NST for Multiple Fetuses: No                            Assessment/Plan   1. Supervision of other normal pregnancy, antepartum ([facility])  - POCT UA (nonautomated) manually resulted  2. AMA (advanced maternal age) multigravida 35+, third trimester ([facility])  - Fetal nonstress test    NST today is reactive.  IOL scheduled.  Precautions given. Advised her to call for any signs/symptoms of labor or other concerns.  Follow up in 1 weeks.

## 2024-09-14 ENCOUNTER — ANESTHESIA EVENT (OUTPATIENT)
Dept: OBSTETRICS AND GYNECOLOGY | Facility: HOSPITAL | Age: 44
End: 2024-09-14
Payer: COMMERCIAL

## 2024-09-14 ENCOUNTER — HOSPITAL ENCOUNTER (INPATIENT)
Facility: HOSPITAL | Age: 44
End: 2024-09-14
Attending: OBSTETRICS & GYNECOLOGY | Admitting: OBSTETRICS & GYNECOLOGY
Payer: COMMERCIAL

## 2024-09-14 ENCOUNTER — ANESTHESIA (OUTPATIENT)
Dept: OBSTETRICS AND GYNECOLOGY | Facility: HOSPITAL | Age: 44
End: 2024-09-14
Payer: COMMERCIAL

## 2024-09-14 PROBLEM — O09.523 AMA (ADVANCED MATERNAL AGE) MULTIGRAVIDA 35+, THIRD TRIMESTER (HHS-HCC): Status: RESOLVED | Noted: 2024-02-26 | Resolved: 2024-09-14

## 2024-09-14 PROBLEM — Z34.80 SUPERVISION OF OTHER NORMAL PREGNANCY, ANTEPARTUM (HHS-HCC): Status: RESOLVED | Noted: 2024-06-13 | Resolved: 2024-09-14

## 2024-09-14 LAB
ABO GROUP (TYPE) IN BLOOD: NORMAL
ALBUMIN SERPL BCP-MCNC: 3.5 G/DL (ref 3.4–5)
ALP SERPL-CCNC: 141 U/L (ref 33–110)
ALT SERPL W P-5'-P-CCNC: 10 U/L (ref 7–45)
ANION GAP SERPL CALC-SCNC: 18 MMOL/L (ref 10–20)
ANTIBODY SCREEN: NORMAL
AST SERPL W P-5'-P-CCNC: 14 U/L (ref 9–39)
BILIRUB SERPL-MCNC: 0.2 MG/DL (ref 0–1.2)
BUN SERPL-MCNC: 11 MG/DL (ref 6–23)
CALCIUM SERPL-MCNC: 9.1 MG/DL (ref 8.6–10.3)
CHLORIDE SERPL-SCNC: 103 MMOL/L (ref 98–107)
CO2 SERPL-SCNC: 21 MMOL/L (ref 21–32)
CREAT SERPL-MCNC: 0.61 MG/DL (ref 0.5–1.05)
CREAT UR-MCNC: 114.5 MG/DL (ref 20–320)
EGFRCR SERPLBLD CKD-EPI 2021: >90 ML/MIN/1.73M*2
ERYTHROCYTE [DISTWIDTH] IN BLOOD BY AUTOMATED COUNT: 15.5 % (ref 11.5–14.5)
GLUCOSE SERPL-MCNC: 70 MG/DL (ref 74–99)
HCT VFR BLD AUTO: 38.3 % (ref 36–46)
HGB BLD-MCNC: 12.4 G/DL (ref 12–16)
MCH RBC QN AUTO: 28.8 PG (ref 26–34)
MCHC RBC AUTO-ENTMCNC: 32.4 G/DL (ref 32–36)
MCV RBC AUTO: 89 FL (ref 80–100)
NRBC BLD-RTO: 0 /100 WBCS (ref 0–0)
PLATELET # BLD AUTO: 248 X10*3/UL (ref 150–450)
POTASSIUM SERPL-SCNC: 3.8 MMOL/L (ref 3.5–5.3)
PROT SERPL-MCNC: 6.3 G/DL (ref 6.4–8.2)
PROT UR-ACNC: 20 MG/DL (ref 5–24)
PROT/CREAT UR: 0.17 MG/MG CREAT (ref 0–0.17)
RBC # BLD AUTO: 4.31 X10*6/UL (ref 4–5.2)
RH FACTOR (ANTIGEN D): NORMAL
SODIUM SERPL-SCNC: 138 MMOL/L (ref 136–145)
WBC # BLD AUTO: 8.6 X10*3/UL (ref 4.4–11.3)

## 2024-09-14 PROCEDURE — 2500000004 HC RX 250 GENERAL PHARMACY W/ HCPCS (ALT 636 FOR OP/ED): Performed by: OBSTETRICS & GYNECOLOGY

## 2024-09-14 PROCEDURE — 86901 BLOOD TYPING SEROLOGIC RH(D): CPT | Performed by: OBSTETRICS & GYNECOLOGY

## 2024-09-14 PROCEDURE — 59409 OBSTETRICAL CARE: CPT | Performed by: OBSTETRICS & GYNECOLOGY

## 2024-09-14 PROCEDURE — 86780 TREPONEMA PALLIDUM: CPT | Mod: GEALAB | Performed by: OBSTETRICS & GYNECOLOGY

## 2024-09-14 PROCEDURE — 59050 FETAL MONITOR W/REPORT: CPT

## 2024-09-14 PROCEDURE — 2500000004 HC RX 250 GENERAL PHARMACY W/ HCPCS (ALT 636 FOR OP/ED): Performed by: NURSE ANESTHETIST, CERTIFIED REGISTERED

## 2024-09-14 PROCEDURE — 7100000016 HC LABOR RECOVERY PER HOUR

## 2024-09-14 PROCEDURE — 82570 ASSAY OF URINE CREATININE: CPT | Performed by: OBSTETRICS & GYNECOLOGY

## 2024-09-14 PROCEDURE — 7210000002 HC LABOR PER HOUR

## 2024-09-14 PROCEDURE — 0KQM0ZZ REPAIR PERINEUM MUSCLE, OPEN APPROACH: ICD-10-PCS | Performed by: OBSTETRICS & GYNECOLOGY

## 2024-09-14 PROCEDURE — 36415 COLL VENOUS BLD VENIPUNCTURE: CPT | Performed by: OBSTETRICS & GYNECOLOGY

## 2024-09-14 PROCEDURE — 51701 INSERT BLADDER CATHETER: CPT

## 2024-09-14 PROCEDURE — 85027 COMPLETE CBC AUTOMATED: CPT | Performed by: OBSTETRICS & GYNECOLOGY

## 2024-09-14 PROCEDURE — 80053 COMPREHEN METABOLIC PANEL: CPT | Performed by: OBSTETRICS & GYNECOLOGY

## 2024-09-14 PROCEDURE — 3700000014 EPIDURAL BLOCK: Performed by: NURSE ANESTHETIST, CERTIFIED REGISTERED

## 2024-09-14 PROCEDURE — 1120000001 HC OB PRIVATE ROOM DAILY

## 2024-09-14 PROCEDURE — 3E033VJ INTRODUCTION OF OTHER HORMONE INTO PERIPHERAL VEIN, PERCUTANEOUS APPROACH: ICD-10-PCS | Performed by: OBSTETRICS & GYNECOLOGY

## 2024-09-14 RX ORDER — OXYTOCIN 10 [USP'U]/ML
10 INJECTION, SOLUTION INTRAMUSCULAR; INTRAVENOUS ONCE AS NEEDED
Status: DISCONTINUED | OUTPATIENT
Start: 2024-09-14 | End: 2024-09-16

## 2024-09-14 RX ORDER — POLYETHYLENE GLYCOL 3350 17 G/17G
17 POWDER, FOR SOLUTION ORAL 2 TIMES DAILY PRN
Status: DISCONTINUED | OUTPATIENT
Start: 2024-09-14 | End: 2024-09-16 | Stop reason: HOSPADM

## 2024-09-14 RX ORDER — FENTANYL/ROPIVACAINE/NS/PF 2MCG/ML-.2
PLASTIC BAG, INJECTION (ML) INJECTION
Status: COMPLETED
Start: 2024-09-14 | End: 2024-09-14

## 2024-09-14 RX ORDER — ACETAMINOPHEN 325 MG/1
975 TABLET ORAL EVERY 6 HOURS
Status: DISCONTINUED | OUTPATIENT
Start: 2024-09-15 | End: 2024-09-16 | Stop reason: HOSPADM

## 2024-09-14 RX ORDER — TRANEXAMIC ACID 100 MG/ML
1000 INJECTION, SOLUTION INTRAVENOUS ONCE AS NEEDED
Status: DISCONTINUED | OUTPATIENT
Start: 2024-09-14 | End: 2024-09-16 | Stop reason: HOSPADM

## 2024-09-14 RX ORDER — NIFEDIPINE 10 MG/1
10 CAPSULE ORAL ONCE AS NEEDED
Status: DISCONTINUED | OUTPATIENT
Start: 2024-09-14 | End: 2024-09-16

## 2024-09-14 RX ORDER — CARBOPROST TROMETHAMINE 250 UG/ML
250 INJECTION, SOLUTION INTRAMUSCULAR ONCE AS NEEDED
Status: DISCONTINUED | OUTPATIENT
Start: 2024-09-14 | End: 2024-09-16

## 2024-09-14 RX ORDER — LOPERAMIDE HYDROCHLORIDE 2 MG/1
4 CAPSULE ORAL EVERY 2 HOUR PRN
Status: DISCONTINUED | OUTPATIENT
Start: 2024-09-14 | End: 2024-09-16 | Stop reason: HOSPADM

## 2024-09-14 RX ORDER — BISACODYL 10 MG/1
10 SUPPOSITORY RECTAL DAILY PRN
Status: DISCONTINUED | OUTPATIENT
Start: 2024-09-14 | End: 2024-09-16 | Stop reason: HOSPADM

## 2024-09-14 RX ORDER — LOPERAMIDE HYDROCHLORIDE 2 MG/1
4 CAPSULE ORAL EVERY 2 HOUR PRN
Status: DISCONTINUED | OUTPATIENT
Start: 2024-09-14 | End: 2024-09-16

## 2024-09-14 RX ORDER — OXYTOCIN/0.9 % SODIUM CHLORIDE 30/500 ML
2-30 PLASTIC BAG, INJECTION (ML) INTRAVENOUS CONTINUOUS
Status: DISCONTINUED | OUTPATIENT
Start: 2024-09-14 | End: 2024-09-16

## 2024-09-14 RX ORDER — ONDANSETRON HYDROCHLORIDE 2 MG/ML
4 INJECTION, SOLUTION INTRAVENOUS EVERY 6 HOURS PRN
Status: DISCONTINUED | OUTPATIENT
Start: 2024-09-14 | End: 2024-09-16

## 2024-09-14 RX ORDER — LIDOCAINE 560 MG/1
1 PATCH PERCUTANEOUS; TOPICAL; TRANSDERMAL
Status: DISCONTINUED | OUTPATIENT
Start: 2024-09-14 | End: 2024-09-16 | Stop reason: HOSPADM

## 2024-09-14 RX ORDER — ONDANSETRON 4 MG/1
4 TABLET, FILM COATED ORAL EVERY 6 HOURS PRN
Status: DISCONTINUED | OUTPATIENT
Start: 2024-09-14 | End: 2024-09-16 | Stop reason: HOSPADM

## 2024-09-14 RX ORDER — METHYLERGONOVINE MALEATE 0.2 MG/ML
0.2 INJECTION INTRAVENOUS ONCE AS NEEDED
Status: DISCONTINUED | OUTPATIENT
Start: 2024-09-14 | End: 2024-09-16 | Stop reason: HOSPADM

## 2024-09-14 RX ORDER — OXYTOCIN 10 [USP'U]/ML
10 INJECTION, SOLUTION INTRAMUSCULAR; INTRAVENOUS ONCE AS NEEDED
Status: DISCONTINUED | OUTPATIENT
Start: 2024-09-14 | End: 2024-09-16 | Stop reason: HOSPADM

## 2024-09-14 RX ORDER — OXYTOCIN/0.9 % SODIUM CHLORIDE 30/500 ML
60 PLASTIC BAG, INJECTION (ML) INTRAVENOUS ONCE AS NEEDED
Status: DISCONTINUED | OUTPATIENT
Start: 2024-09-14 | End: 2024-09-16 | Stop reason: HOSPADM

## 2024-09-14 RX ORDER — ONDANSETRON 4 MG/1
4 TABLET, FILM COATED ORAL EVERY 6 HOURS PRN
Status: DISCONTINUED | OUTPATIENT
Start: 2024-09-14 | End: 2024-09-16

## 2024-09-14 RX ORDER — METOCLOPRAMIDE 10 MG/1
10 TABLET ORAL EVERY 6 HOURS PRN
Status: DISCONTINUED | OUTPATIENT
Start: 2024-09-14 | End: 2024-09-16

## 2024-09-14 RX ORDER — TERBUTALINE SULFATE 1 MG/ML
0.25 INJECTION SUBCUTANEOUS ONCE AS NEEDED
Status: DISCONTINUED | OUTPATIENT
Start: 2024-09-14 | End: 2024-09-16

## 2024-09-14 RX ORDER — LABETALOL HYDROCHLORIDE 5 MG/ML
20 INJECTION, SOLUTION INTRAVENOUS ONCE AS NEEDED
Status: DISCONTINUED | OUTPATIENT
Start: 2024-09-14 | End: 2024-09-16

## 2024-09-14 RX ORDER — MISOPROSTOL 200 UG/1
800 TABLET ORAL ONCE AS NEEDED
Status: DISCONTINUED | OUTPATIENT
Start: 2024-09-14 | End: 2024-09-16 | Stop reason: HOSPADM

## 2024-09-14 RX ORDER — MISOPROSTOL 200 UG/1
800 TABLET ORAL ONCE AS NEEDED
Status: DISCONTINUED | OUTPATIENT
Start: 2024-09-14 | End: 2024-09-16

## 2024-09-14 RX ORDER — HYDRALAZINE HYDROCHLORIDE 20 MG/ML
5 INJECTION INTRAMUSCULAR; INTRAVENOUS ONCE AS NEEDED
Status: DISCONTINUED | OUTPATIENT
Start: 2024-09-14 | End: 2024-09-16 | Stop reason: HOSPADM

## 2024-09-14 RX ORDER — ONDANSETRON HYDROCHLORIDE 2 MG/ML
4 INJECTION, SOLUTION INTRAVENOUS EVERY 6 HOURS PRN
Status: DISCONTINUED | OUTPATIENT
Start: 2024-09-14 | End: 2024-09-16 | Stop reason: HOSPADM

## 2024-09-14 RX ORDER — SODIUM CHLORIDE, SODIUM LACTATE, POTASSIUM CHLORIDE, CALCIUM CHLORIDE 600; 310; 30; 20 MG/100ML; MG/100ML; MG/100ML; MG/100ML
125 INJECTION, SOLUTION INTRAVENOUS CONTINUOUS
Status: DISCONTINUED | OUTPATIENT
Start: 2024-09-14 | End: 2024-09-16

## 2024-09-14 RX ORDER — DIPHENHYDRAMINE HYDROCHLORIDE 50 MG/ML
25 INJECTION INTRAMUSCULAR; INTRAVENOUS EVERY 6 HOURS PRN
Status: DISCONTINUED | OUTPATIENT
Start: 2024-09-14 | End: 2024-09-16 | Stop reason: HOSPADM

## 2024-09-14 RX ORDER — NIFEDIPINE 10 MG/1
10 CAPSULE ORAL ONCE AS NEEDED
Status: DISCONTINUED | OUTPATIENT
Start: 2024-09-14 | End: 2024-09-16 | Stop reason: HOSPADM

## 2024-09-14 RX ORDER — ADHESIVE BANDAGE
10 BANDAGE TOPICAL
Status: DISCONTINUED | OUTPATIENT
Start: 2024-09-14 | End: 2024-09-16 | Stop reason: HOSPADM

## 2024-09-14 RX ORDER — HYDRALAZINE HYDROCHLORIDE 20 MG/ML
5 INJECTION INTRAMUSCULAR; INTRAVENOUS ONCE AS NEEDED
Status: DISCONTINUED | OUTPATIENT
Start: 2024-09-14 | End: 2024-09-16

## 2024-09-14 RX ORDER — SIMETHICONE 80 MG
80 TABLET,CHEWABLE ORAL 4 TIMES DAILY PRN
Status: DISCONTINUED | OUTPATIENT
Start: 2024-09-14 | End: 2024-09-16 | Stop reason: HOSPADM

## 2024-09-14 RX ORDER — LABETALOL HYDROCHLORIDE 5 MG/ML
20 INJECTION, SOLUTION INTRAVENOUS ONCE AS NEEDED
Status: DISCONTINUED | OUTPATIENT
Start: 2024-09-14 | End: 2024-09-16 | Stop reason: HOSPADM

## 2024-09-14 RX ORDER — OXYTOCIN/0.9 % SODIUM CHLORIDE 30/500 ML
60 PLASTIC BAG, INJECTION (ML) INTRAVENOUS ONCE AS NEEDED
Status: DISCONTINUED | OUTPATIENT
Start: 2024-09-14 | End: 2024-09-16

## 2024-09-14 RX ORDER — FENTANYL/ROPIVACAINE/NS/PF 2MCG/ML-.2
0-25 PLASTIC BAG, INJECTION (ML) INJECTION CONTINUOUS
Status: DISCONTINUED | OUTPATIENT
Start: 2024-09-14 | End: 2024-09-16

## 2024-09-14 RX ORDER — ENOXAPARIN SODIUM 100 MG/ML
40 INJECTION SUBCUTANEOUS EVERY 24 HOURS
Status: DISCONTINUED | OUTPATIENT
Start: 2024-09-15 | End: 2024-09-16 | Stop reason: HOSPADM

## 2024-09-14 RX ORDER — METHYLERGONOVINE MALEATE 0.2 MG/ML
0.2 INJECTION INTRAVENOUS ONCE AS NEEDED
Status: DISCONTINUED | OUTPATIENT
Start: 2024-09-14 | End: 2024-09-16

## 2024-09-14 RX ORDER — DIPHENHYDRAMINE HCL 25 MG
25 CAPSULE ORAL EVERY 6 HOURS PRN
Status: DISCONTINUED | OUTPATIENT
Start: 2024-09-14 | End: 2024-09-16 | Stop reason: HOSPADM

## 2024-09-14 RX ORDER — CARBOPROST TROMETHAMINE 250 UG/ML
250 INJECTION, SOLUTION INTRAMUSCULAR ONCE AS NEEDED
Status: DISCONTINUED | OUTPATIENT
Start: 2024-09-14 | End: 2024-09-16 | Stop reason: HOSPADM

## 2024-09-14 RX ORDER — TRANEXAMIC ACID 100 MG/ML
1000 INJECTION, SOLUTION INTRAVENOUS ONCE
Status: DISCONTINUED | OUTPATIENT
Start: 2024-09-14 | End: 2024-09-16

## 2024-09-14 RX ORDER — IBUPROFEN 600 MG/1
600 TABLET ORAL EVERY 6 HOURS
Status: DISCONTINUED | OUTPATIENT
Start: 2024-09-15 | End: 2024-09-16 | Stop reason: HOSPADM

## 2024-09-14 RX ORDER — METOCLOPRAMIDE HYDROCHLORIDE 5 MG/ML
10 INJECTION INTRAMUSCULAR; INTRAVENOUS EVERY 6 HOURS PRN
Status: DISCONTINUED | OUTPATIENT
Start: 2024-09-14 | End: 2024-09-16

## 2024-09-14 RX ORDER — LIDOCAINE HYDROCHLORIDE 10 MG/ML
30 INJECTION, SOLUTION INFILTRATION; PERINEURAL ONCE AS NEEDED
Status: DISCONTINUED | OUTPATIENT
Start: 2024-09-14 | End: 2024-09-16

## 2024-09-14 SDOH — HEALTH STABILITY: MENTAL HEALTH: HAVE YOU USED ANY SUBSTANCES (CANABIS, COCAINE, HEROIN, HALLUCINOGENS, INHALANTS, ETC.) IN THE PAST 12 MONTHS?: NO

## 2024-09-14 SDOH — SOCIAL STABILITY: SOCIAL INSECURITY: HAVE YOU HAD THOUGHTS OF HARMING ANYONE ELSE?: NO

## 2024-09-14 SDOH — SOCIAL STABILITY: SOCIAL INSECURITY: ARE YOU OR HAVE YOU BEEN THREATENED OR ABUSED PHYSICALLY, EMOTIONALLY, OR SEXUALLY BY ANYONE?: NO

## 2024-09-14 SDOH — HEALTH STABILITY: MENTAL HEALTH: NON-SPECIFIC ACTIVE SUICIDAL THOUGHTS (PAST 1 MONTH): NO

## 2024-09-14 SDOH — SOCIAL STABILITY: SOCIAL INSECURITY: HAVE YOU HAD ANY THOUGHTS OF HARMING ANYONE ELSE?: NO

## 2024-09-14 SDOH — SOCIAL STABILITY: SOCIAL INSECURITY: ABUSE SCREEN: ADULT

## 2024-09-14 SDOH — HEALTH STABILITY: MENTAL HEALTH: WISH TO BE DEAD (PAST 1 MONTH): NO

## 2024-09-14 SDOH — SOCIAL STABILITY: SOCIAL INSECURITY: PHYSICAL ABUSE: DENIES

## 2024-09-14 SDOH — HEALTH STABILITY: MENTAL HEALTH: CURRENT SMOKER: 0

## 2024-09-14 SDOH — SOCIAL STABILITY: SOCIAL INSECURITY: VERBAL ABUSE: DENIES

## 2024-09-14 SDOH — HEALTH STABILITY: MENTAL HEALTH: WERE YOU ABLE TO COMPLETE ALL THE BEHAVIORAL HEALTH SCREENINGS?: YES

## 2024-09-14 SDOH — ECONOMIC STABILITY: HOUSING INSECURITY: DO YOU FEEL UNSAFE GOING BACK TO THE PLACE WHERE YOU ARE LIVING?: NO

## 2024-09-14 SDOH — HEALTH STABILITY: MENTAL HEALTH: SUICIDAL BEHAVIOR (LIFETIME): NO

## 2024-09-14 SDOH — SOCIAL STABILITY: SOCIAL INSECURITY: HAS ANYONE EVER THREATENED TO HURT YOUR FAMILY OR YOUR PETS?: NO

## 2024-09-14 SDOH — HEALTH STABILITY: MENTAL HEALTH: HAVE YOU USED ANY PRESCRIPTION DRUGS OTHER THAN PRESCRIBED IN THE PAST 12 MONTHS?: NO

## 2024-09-14 SDOH — HEALTH STABILITY: MENTAL HEALTH: STRENGTHS (MUST CHOOSE TWO): SUPPORT FROM FAMILY;STABLE HOUSING

## 2024-09-14 SDOH — SOCIAL STABILITY: SOCIAL INSECURITY: DOES ANYONE TRY TO KEEP YOU FROM HAVING/CONTACTING OTHER FRIENDS OR DOING THINGS OUTSIDE YOUR HOME?: NO

## 2024-09-14 SDOH — SOCIAL STABILITY: SOCIAL INSECURITY: ARE THERE ANY APPARENT SIGNS OF INJURIES/BEHAVIORS THAT COULD BE RELATED TO ABUSE/NEGLECT?: NO

## 2024-09-14 SDOH — SOCIAL STABILITY: SOCIAL INSECURITY: DO YOU FEEL ANYONE HAS EXPLOITED OR TAKEN ADVANTAGE OF YOU FINANCIALLY OR OF YOUR PERSONAL PROPERTY?: NO

## 2024-09-14 ASSESSMENT — PATIENT HEALTH QUESTIONNAIRE - PHQ9
2. FEELING DOWN, DEPRESSED OR HOPELESS: NOT AT ALL
SUM OF ALL RESPONSES TO PHQ9 QUESTIONS 1 & 2: 0
1. LITTLE INTEREST OR PLEASURE IN DOING THINGS: NOT AT ALL

## 2024-09-14 ASSESSMENT — PAIN SCALES - GENERAL
PAINLEVEL_OUTOF10: 1
PAINLEVEL_OUTOF10: 0 - NO PAIN
PAIN_LEVEL: 2
PAINLEVEL_OUTOF10: 0 - NO PAIN
PAINLEVEL_OUTOF10: 0 - NO PAIN

## 2024-09-14 ASSESSMENT — ACTIVITIES OF DAILY LIVING (ADL): LACK_OF_TRANSPORTATION: NO

## 2024-09-14 ASSESSMENT — LIFESTYLE VARIABLES
AUDIT-C TOTAL SCORE: 0
HOW MANY STANDARD DRINKS CONTAINING ALCOHOL DO YOU HAVE ON A TYPICAL DAY: PATIENT DOES NOT DRINK
HOW OFTEN DO YOU HAVE A DRINK CONTAINING ALCOHOL: NEVER
SKIP TO QUESTIONS 9-10: 1
AUDIT-C TOTAL SCORE: 0
HOW OFTEN DO YOU HAVE 6 OR MORE DRINKS ON ONE OCCASION: NEVER

## 2024-09-14 NOTE — ANESTHESIA PREPROCEDURE EVALUATION
Patient: Janett Ray    Evaluation Method: In-person visit    Procedure Information    Date: 09/14/24  Procedure: Labor Analgesia         Relevant Problems   Anesthesia (within normal limits)      Cardiac (within normal limits)      Pulmonary (within normal limits)      Neuro (within normal limits)      GI (within normal limits)      /Renal (within normal limits)      Liver (within normal limits)      Endocrine (within normal limits)      Hematology (within normal limits)      Musculoskeletal (within normal limits)      HEENT (within normal limits)      ID (within normal limits)      Skin (within normal limits)      GYN   (+) AMA (advanced maternal age) multigravida 35+, third trimester ([facility])   (+) Supervision of other normal pregnancy, antepartum ([facility])       Clinical information reviewed:    Allergies  Meds  Problems              NPO Detail:  No data recorded     OB/GYN     Physical Exam    Airway  Mallampati: II  TM distance: >3 FB  Neck ROM: full     Cardiovascular - normal exam  Rhythm: regular  Rate: normal     Dental - normal exam     Pulmonary - normal exam  Breath sounds clear to auscultation     Abdominal - normal exam  Abdomen: soft  Bowel sounds: normal           Anesthesia Plan    History of general anesthesia?: no  History of complications of general anesthesia?: no    ASA 2     epidural     The patient is not a current smoker.  Patient was not previously instructed to abstain from smoking on day of procedure.  Patient did not smoke on day of procedure.  Education provided regarding risk of obstructive sleep apnea.  Anesthetic plan and risks discussed with patient.  Use of blood products discussed with patient who.    Plan discussed with CRNA.

## 2024-09-14 NOTE — PROGRESS NOTES
Q  2-3  min   ctxs   s/p  epidural   cx  6/90/-1  fhts  cat  1,    labs   nl    bps   nl   or   mild  range  dr carrington  called

## 2024-09-14 NOTE — ANESTHESIA PROCEDURE NOTES
Epidural Block    Start time: 9/14/2024 6:55 PM  Reason for block: labor analgesia  Staffing  Performed: CRNA   Authorized by: NURA Paige    Performed by: NURA Paige    Preanesthetic Checklist  Completed: patient identified, IV checked, risks and benefits discussed, surgical consent, monitors and equipment checked, pre-op evaluation, timeout performed and sterile techniques followed  Block Timeout  RN/Licensed healthcare professional reads aloud to the Anesthesia provider and entire team: Patient identity, procedure with side and site, patient position, and as applicable the availability of implants/special equipment/special requirements.  Patient on coagulant treatment: no  Timeout performed at: 9/14/2024 6:55 PM  Block Placement  Patient position: sitting  Prep: ChloraPrep  Sterility prep: cap, drape, gloves, hand and mask  Sedation level: no sedation  Patient monitoring: heart rate, EKG, continuous pulse oximetry and blood pressure  Approach: midline  Local numbing: lidocaine 1% to skin and subcutaneous tissues  Vertebral space: lumbar  Lumbar location: L3-L4  Epidural  Loss of resistance technique: saline  Guidance: landmark technique        Needle  Needle type: Tuohy   Needle gauge: 19  Needle length: 11.4 cm  Needle insertion depth: 7 cm  Catheter type: multi-orifice  Catheter size: 21 G  Catheter at skin depth: 15 cm  Catheter securement method: clear occlusive dressing and surgical tape    Test dose: lidocaine 1.5% with epinephrine 1-to-200,000  Test dose: lidocaine 1.5% with epinephrine 1-to-200,000  Test dose result: no positive test dose            Assessment  Sensory level: T4  Block outcome: patient comfortable  Number of attempts: 1  Events: no positive test dose  Procedure assessment: patient tolerated procedure well with no immediate complications

## 2024-09-14 NOTE — H&P
Note Type:  OB Admission H&P    ASSESSMENT & PLAN: Janett Ray is a 44 y.o.  at 39w4d who is admitted for Labor,    srom 3 am  clear   rare  ctxs ,  for  dara  , prenatal  neg   aller  neg meds   2 basa q day , hx  of   gest  htn  last   preg  pmh  neg  psh  c/s  smoke  etoh drugs   neg    gbs  neg    Plan:    -Admit to L&D, consented,  cephalic based on: cervical exam  -Labs drawn:  T&S, CBC, and Syphilis  -Epidural at patient request  -Recheck as clinically indicated by maternal or fetal status,   cmp   p/c ratio  for   mild  range bps     Fetal Status  -NST reactive, reassuring   -Presentation vtx  -EFW 3400 by exam  -  -  -GBS neg  -NICU consult no    -Postpartum Contraception Plan:     Pregnancy Problems (from 24 to present)       Problem Noted Resolved    Supervision of other normal pregnancy, antepartum ([facility]) 2024 by Caitlin Smith MD No    Priority:  Medium      AMA (advanced maternal age) multigravida 35+, third trimester ([facility]) 2024 by Caitlin Smith MD No    Priority:  Medium              Subjective   Good fetal movement.  C/O of occasional contractions.    Prenatal Provider carrington    OB History    Para Term  AB Living   5 2 2 0 2 2   SAB IAB Ectopic Multiple Live Births   2 0 0 0 2      # Outcome Date GA Lbr Luiz/2nd Weight Sex Type Anes PTL Lv   5 Current            4 2023           3 2022           2 Term 19   4.536 kg M CS-LTranv Spinal N SINDHU      Complications: Gestational diabetes ([facility])      Name: JORDON   1 Term 06   2.722 kg F Vag-Spont None N SINDHU      Name: KATE       Past Surgical History:   Procedure Laterality Date    OTHER SURGICAL HISTORY  03/15/2019    Colposcopy       Social History     Tobacco Use    Smoking status: Never    Smokeless tobacco: Never   Substance Use Topics    Alcohol use: Never       No Known Allergies    Medications Prior to Admission   Medication Sig Dispense Refill Last Dose    aspirin 81  mg EC tablet Take 2 tablets (162 mg) by mouth once daily.   9/14/2024    prenatal vit no.124-iron-folic (Prenatal Vitamin) 27 mg iron- 800 mcg tablet Take one tablet by mouth daily 90 tablet 3 9/14/2024     Objective     Last Vitals  Temp Pulse Resp BP MAP O2 Sat   36.6 °C (97.9 °F) 83 18 (!) 143/77 104 98 %     Blood Pressures         9/14/2024  1533 9/14/2024  1535 9/14/2024  1556 9/14/2024  1611 9/14/2024  1626    BP: 145/86 145/86 136/81 143/82 143/77             Physical Exam  General: NAD, mood appropriate  Cardiopulmonary: warm and well perfused, breathing comfortably on room air  Abdomen: Gravid, non-tender  Extremities: full range of motion  Speculum Exam: pooled fluid appearing ntz   pos , deferred  Cervix: 1 /40 /-1   1-2/40/-2 vtx      Fetal Monitoring  Baseline: 140 bpm, Variability: moderate,  Accelerations: present and Decelerations: none    Uterine Activity: Irregular contractions    Interpretation: Category 1

## 2024-09-15 VITALS
DIASTOLIC BLOOD PRESSURE: 68 MMHG | RESPIRATION RATE: 18 BRPM | BODY MASS INDEX: 36.97 KG/M2 | HEIGHT: 67 IN | OXYGEN SATURATION: 97 % | WEIGHT: 235.56 LBS | SYSTOLIC BLOOD PRESSURE: 125 MMHG | TEMPERATURE: 97.9 F | HEART RATE: 78 BPM

## 2024-09-15 LAB — TREPONEMA PALLIDUM IGG+IGM AB [PRESENCE] IN SERUM OR PLASMA BY IMMUNOASSAY: NONREACTIVE

## 2024-09-15 PROCEDURE — 2500000001 HC RX 250 WO HCPCS SELF ADMINISTERED DRUGS (ALT 637 FOR MEDICARE OP): Performed by: OBSTETRICS & GYNECOLOGY

## 2024-09-15 PROCEDURE — 1120000001 HC OB PRIVATE ROOM DAILY

## 2024-09-15 PROCEDURE — 2500000004 HC RX 250 GENERAL PHARMACY W/ HCPCS (ALT 636 FOR OP/ED): Performed by: OBSTETRICS & GYNECOLOGY

## 2024-09-15 PROCEDURE — 88307 TISSUE EXAM BY PATHOLOGIST: CPT | Mod: TC,GEALAB | Performed by: OBSTETRICS & GYNECOLOGY

## 2024-09-15 ASSESSMENT — PAIN SCALES - GENERAL
PAINLEVEL_OUTOF10: 2
PAINLEVEL_OUTOF10: 4
PAINLEVEL_OUTOF10: 6
PAINLEVEL_OUTOF10: 5 - MODERATE PAIN
PAINLEVEL_OUTOF10: 4
PAINLEVEL_OUTOF10: 0 - NO PAIN
PAINLEVEL_OUTOF10: 4

## 2024-09-15 ASSESSMENT — PAIN DESCRIPTION - LOCATION
LOCATION: PERINEUM

## 2024-09-15 ASSESSMENT — PAIN SCALES - PAIN ASSESSMENT IN ADVANCED DEMENTIA (PAINAD): TOTALSCORE: COLD PACK;MEDICATION (SEE MAR)

## 2024-09-15 NOTE — PROGRESS NOTES
"Not feeling ctx with epidural.    /68   Pulse 103   Temp 36.7 °C (98 °F) (Oral)   Resp 18   Ht 1.702 m (5' 7\")   Wt 107 kg (235 lb 9 oz)   LMP 12/12/2023 (Exact Date)   SpO2 99%   BMI 36.89 kg/m²    Heart Rate:  []   Temp:  [36.6 °C (97.9 °F)-37.1 °C (98.8 °F)]   Resp:  [18]   BP: (128-150)/(68-96)   Height:  [170.2 cm (5' 7\")]   Weight:  [107 kg (235 lb 9 oz)]   SpO2:  [97 %-100 %]     SVE 10/100/0   mod robinson +accels no decels  Garden ctx q 2 min    Second stage  Began pushing efforts  No descent with pushing efforts  Turned down epidural as she is frustrated she cannot feel her pushing  Infant ROP, verified by BSUS  Turned to DELANO position  FHT cat 1   continue pushing efforts    "

## 2024-09-15 NOTE — CARE PLAN
The patient's goals for the shift include      The clinical goals for the shift include pain level less than 4; progresses activity      Problem: Postpartum  Goal: Experiences normal postpartum course  Outcome: Progressing  Goal: Appropriate maternal -  bonding  Outcome: Progressing  Goal: Establish and maintain infant feeding pattern for adequate nutrition  Outcome: Progressing  Goal: Incisions, wounds, or drain sites healing without S/S of infection  Outcome: Progressing  Goal: No s/sx infection  Outcome: Progressing  Goal: No s/sx of hemorrhage  Outcome: Progressing  Goal: Minimal s/sx of HDP and BP<160/110  Outcome: Progressing

## 2024-09-15 NOTE — PROGRESS NOTES
"Patient doing well. No complaints. Pain overall controlled. Lochia light. Ambulating without weakness, dizziness or SOB. Tolerating regular diet without nausea or vomiting. Voiding spontaneously.  No PEC sx: HA, vision changes, CP, SOB, RUQ pain.     /68   Pulse 76   Temp 36.6 °C (97.9 °F) (Oral)   Resp 16   Ht 1.702 m (5' 7\")   Wt 107 kg (235 lb 9 oz)   LMP 2023 (Exact Date)   SpO2 97%   Breastfeeding Yes   BMI 36.89 kg/m²    Heart Rate:  []   Temp:  [36.6 °C (97.9 °F)-37.1 °C (98.8 °F)]   Resp:  [16-18]   BP: (121-150)/(59-96)   Height:  [170.2 cm (5' 7\")]   Weight:  [107 kg (235 lb 9 oz)]   SpO2:  [83 %-100 %]    Physical Exam  Constitutional:       Appearance: Normal appearance.   HENT:      Head: Normocephalic and atraumatic.   Eyes:      Extraocular Movements: Extraocular movements intact.      Conjunctiva/sclera: Conjunctivae normal.      Pupils: Pupils are equal, round, and reactive to light.   Pulmonary:      Effort: Pulmonary effort is normal.   Abdominal:      Comments: fundus firm, below umbilicus  Soft, ATTP    Skin:     General: Skin is dry.   Neurological:      General: No focal deficit present.      Mental Status: She is alert.      Comments: 2+ DTRs   Psychiatric:         Mood and Affect: Mood normal.         Behavior: Behavior normal.         Thought Content: Thought content normal.         Judgment: Judgment normal.      PPD#1 s/p ,  recovering well  - GHTN dx inpatient. No meds. Bps normal to mild. CBC and CMP negative.   - laceration: second degree --> continue pericare   - hemodynamic status, stable: starting hgb 12.4 with delivery  mL.  VSS. No si/sx of anemia requiring transfusion.  - pain controlled  - meeting all postpartum milestones  - Rh status: pos    - DVT prophylaxis: SCDs, ambulation, lovenox  - continue monitoring for BP  Staley, MD   "

## 2024-09-15 NOTE — ANESTHESIA POSTPROCEDURE EVALUATION
Patient: Janett Ray    Procedure Summary       Date: 09/14/24 Room / Location:     Anesthesia Start: 1855 Anesthesia Stop: 2238    Procedure: Labor Analgesia Diagnosis:     Scheduled Providers:  Responsible Provider: NURA Paige    Anesthesia Type: epidural ASA Status: 2            Anesthesia Type: epidural    Vitals Value Taken Time   /80 09/14/24 2312   Temp  09/14/24 2327   Pulse 95 09/14/24 2312   Resp  09/14/24 2327   SpO2 94 % 09/14/24 2312       Anesthesia Post Evaluation    Patient location during evaluation: bedside  Patient participation: complete - patient participated  Level of consciousness: awake and alert  Pain score: 2  Pain management: adequate  Multimodal analgesia pain management approach  Airway patency: patent  Two or more strategies used to mitigate risk of obstructive sleep apnea  Cardiovascular status: acceptable  Respiratory status: acceptable  Hydration status: acceptable  Postoperative Nausea and Vomiting: none        No notable events documented.

## 2024-09-15 NOTE — CARE PLAN
Problem: Vaginal Birth or  Section  Goal: Fetal and maternal status remain reassuring during the birth process  Outcome: Progressing  Goal: Prevention of malpresentation/labor dystocia through delivery  Outcome: Progressing  Goal: Demonstrates labor coping techniques through delivery  Outcome: Progressing  Goal: Minimal s/sx of HDP and BP<160/110  Outcome: Progressing  Goal: No s/sx of infection through recovery  Outcome: Progressing  Goal: No s/sx of hemorrhage through recovery  Outcome: Progressing     Problem: Pain - Adult  Goal: Verbalizes/displays adequate comfort level or baseline comfort level  Outcome: Progressing     Problem: Safety - Adult  Goal: Free from fall injury  Outcome: Progressing

## 2024-09-15 NOTE — LACTATION NOTE
This note was copied from a baby's chart.  Lactation Consultant Note       09/15/24 1137   Lactation Consultation   Reason for Consult Initial assessment   Consultant Name CAREN Crowe IBCLC   Maternal Information   Has mother  before? Yes   How long did the mother previously breastfeed? 4 months with her now 18 year old daughter and 6 months with her now 4 year old son   Previous Maternal Breastfeeding Challenges None   Infant to breast within first 2 hours of birth? Yes   Exclusive Pump and Bottle Feed No   Maternal Assessment   Breast Assessment Large;Symmetrical;Compressible   Nipple Assessment Intact  (per mother)   Alterations in Nipple Condition   (denies pain or skin breakdown)   [place] Assessment Normal   Infant Assessment   Infant Behavior Active alert;Readiness to feed;Feeding cues observed;Rooting response;Sucking;Content after feeding   Infant Assessment   (full term infant, LGA, less than 12 hours old)   Feeding Assessment   Nutrition Source Breastmilk;Formula (per mother’s request)   Feeding Method Nursing at the breast;Paced bottle   Feeding Position Cradle;Mother demonstrates good positioning   Suck/Feeding Sustained;Coordinated suck/swallow/breathe;Baby led rhythmically   Latch Assessment Instructed on deep latch;Deep latch obtained;Optimal angle of mouth opening;Comfortable with no pain;Flanged lips;Chin moves in rhythmic motion;Sucking and swallowing;Sucks with long jaw movement   LATCH Tool   Latch 2   Audible Swallowing 2   Type of Nipple 2   Comfort (Breast/Nipple) 2   Hold (Positioning) 2   LATCH Score 10   Breast Pump   Pump   (needs a personal breast pump, order initiated for mother)   Patient Follow-Up   Inpatient Lactation Follow-up Needed  Yes   Outpatient Lactation Follow-up Recommended   Other OB Lactation Documentation    Maternal Risk Factors Hypertension   41 year old  experienced breastfeeding mother with LGA baby. Met with parents for initial lactation consult to assess  "breastfeeding progress, to address any questions and/or concerns and to offer lactation assistance, support and education as needed and desired. This pregnancy notable for GHTN.  Reports breast changes during pregnancy. Denies history of breast or nipple surgery. Mother's only concern is that she \"has no milk\". Per bedside RN, mother did ask for a bottle of formula this morning. Education provided on benefits of colostrum. Verbalizes confidence with positioning and latch. Denies pain or skin breakdown. Reports infant has a strong suck but it is not painful.     Breastfeeding handouts provided. Breastfeeding education and support provided throughout consult. Parents provided with the opportunity to ask questions. All questions answered. See education flow sheet for detailed list of education topics covered. Reviewed importance of frequent skin to skin contact, waking techniques, infant stomach capacity, value of colostrum feeds and typical  feeding behaviors in the first 24 hours. Encouraged frequent skin to skin and nursing with cues and at least 8 times in the first 24 hours. Reviewed signs of adequate intake/output. Parents deny any further questions or concerns at this time. Offered ongoing breastfeeding assistance, support and education as needed and desired.    "

## 2024-09-15 NOTE — CARE PLAN
The patient's goals for the shift include  rest, bond with baby    The clinical goals for the shift include pain level less than 4; progresses activity    Pain well controlled. Progressing activity, independent with self care. Mild BP x1  Problem: Vaginal Birth or  Section  Goal: Fetal and maternal status remain reassuring during the birth process  Outcome: Met  Goal: Prevention of malpresentation/labor dystocia through delivery  Outcome: Met  Goal: Demonstrates labor coping techniques through delivery  Outcome: Met  Goal: Minimal s/sx of HDP and BP<160/110  Outcome: Met  Goal: No s/sx of infection through recovery  Outcome: Met  Goal: No s/sx of hemorrhage through recovery  Outcome: Met     Problem: Pain - Adult  Goal: Verbalizes/displays adequate comfort level or baseline comfort level  Outcome: Met     Problem: Safety - Adult  Goal: Free from fall injury  Outcome: Met

## 2024-09-16 VITALS
WEIGHT: 235.56 LBS | BODY MASS INDEX: 36.97 KG/M2 | HEART RATE: 73 BPM | HEIGHT: 67 IN | RESPIRATION RATE: 18 BRPM | SYSTOLIC BLOOD PRESSURE: 131 MMHG | TEMPERATURE: 97.3 F | OXYGEN SATURATION: 95 % | DIASTOLIC BLOOD PRESSURE: 78 MMHG

## 2024-09-16 PROBLEM — O34.219 VAGINAL BIRTH AFTER CESAREAN (HHS-HCC): Status: ACTIVE | Noted: 2024-09-14

## 2024-09-16 PROCEDURE — 2500000001 HC RX 250 WO HCPCS SELF ADMINISTERED DRUGS (ALT 637 FOR MEDICARE OP): Performed by: OBSTETRICS & GYNECOLOGY

## 2024-09-16 RX ORDER — IBUPROFEN 200 MG
600 TABLET ORAL EVERY 6 HOURS
COMMUNITY
Start: 2024-09-16

## 2024-09-16 RX ORDER — ACETAMINOPHEN 500 MG
1000 TABLET ORAL EVERY 6 HOURS PRN
COMMUNITY
Start: 2024-09-16

## 2024-09-16 ASSESSMENT — PAIN SCALES - GENERAL
PAINLEVEL_OUTOF10: 1
PAINLEVEL_OUTOF10: 3

## 2024-09-16 ASSESSMENT — PAIN DESCRIPTION - LOCATION: LOCATION: PERINEUM

## 2024-09-16 NOTE — DISCHARGE INSTRUCTIONS
"    [facility]  [address], [place] [address]  [address], [place] [address]  Telephone: [phone] [place] or [phone] [place]    After Discharge Orders:  Future Appointments   Date Time Provider Department Center   9/19/2024  8:00 AM Avelina Huynh, DO [id number]           Call the Physician with any severe blood pressure elevation signs and symptoms:    Warning signs regarding BP:  BP level of 160/110 or higher  Severe headache or headache that does not resolve with over the counter pain medication  Chest pain with or without shortness of breath  Visual changes, particularly loss of visual field     BP monitoring:  Monitor your BP at home with your home cuff twice daily and record your readings  Make sure you have been at rest and without stress, recent caffeine/nicotine when you take your BP readings. Your arm that is in the BP cuff should be roughly at the level of the heart and your feet flat on the floor  Follow up in the office in 5-7 days for a BP check with one of our medical assistants or nurses    After your delivery - signs and symptoms to watch for:  Fever - Oral temperature greater than 100.4 degrees Fahrenheit  Foul-smelling vaginal discharge  Headache unrelieved by \"pain meds\"  Difficulty urinating  Breasts reddened, hard, hot to the touch  Nipple discharge which is foul-smelling or contains pus  Increased pain at the site of the laceration  Difficulty breathing with or without chest pain  New calf pain especially if only on one side  Sudden, continuing increased vaginal bleeding with or without clots  Unrelieved feelings of:  Inability to cope  Sadness  Anxiety  Lack of interest in baby  Insomnia  Crying     What to do at home:  See patient education handouts for full information  Resume activity gradually   Don't lift anything heavier than baby and carrier until OK'd by your Physician  No sex until OK'd by your Physician "   Take care of yourself by sleeping/resting as much as possible  Eat regular nutritious meals  Let someone else care for you, your baby, and housework as much as possible   Take pain medication as prescribed whenever you need them  Wear compression stockings if prescribed   To avoid/relieve constipation take stool softeners if advised   Drink lots of water/fruit juices  Increase fiber in your diet  Breast care: Wear support bra ; use lanolin ointment/cream as needed     Refer to  Discharge Instructions for problems or follow-up regarding  nursing

## 2024-09-16 NOTE — CARE PLAN
Problem: Postpartum  Goal: Experiences normal postpartum course  Outcome: Met  Goal: Appropriate maternal -  bonding  Outcome: Met  Goal: Establish and maintain infant feeding pattern for adequate nutrition  Outcome: Met  Goal: Incisions, wounds, or drain sites healing without S/S of infection  Outcome: Met

## 2024-09-16 NOTE — LACTATION NOTE
This note was copied from a baby's chart.  Lactation Consultant Note     24 0152   Lactation Consultation   Reason for Consult Follow-up assessment   Consultant Name ZAINAB Liz RN, IBCLC   Maternal Information   Has mother  before? Yes   How long did the mother previously breastfeed? 7 months with her now 5 y/o, 3 months with her now 19 y/o   Infant to breast within first 2 hours of birth? Yes   Exclusive Pump and Bottle Feed No   Maternal Assessment   Breast Assessment   ( did not assess at this time.)   Nipple Assessment Intact;Sore  (per mother,  did not assess)   Infant Assessment   Infant Behavior Light sleep   Infant Assessment   (39.4 weeks, approximately 34 HOL, 3 voids/2 stools in last 24 hours, -5.15% weight loss @28 HOL)   Feeding Assessment   Nutrition Source Breastmilk;Formula (per mother’s request)   Feeding Method Nursing at the breast;Paced bottle   Unable to assess infant feeding at this time Other (Comment)  (Mother states  just took formula bottle approximately 30 minutes ago.)   Breast Pump   Pump   (Mother awaiting response from [employer] regarding pump approval.  to contact [employer] prior to mother's discharge.)   Other OB Lactation Tools   Lactation Tools Lanolin   Patient Follow-Up   Inpatient Lactation Follow-up Needed    (as needed prior to discharge)   Outpatient Lactation Follow-up Recommended  (Mother states she will call and schedule if needed.)   Lactation Professional - OK to Discharge Yes   Other OB Lactation Documentation    Maternal Risk Factors Hypertension   Infant Risk Factors High birth weight >3600 g;Infant Apgar <8       Recommendations/Summary  45 y/o  experienced breastfeeding mother with vaginal delivery of full term  boy approximately 34 hours ago. Mother's admission notable for GHTN and  LGA. Mother states she plans to breastfeed this  for as long as possible and that she  her now 19 y/o for 3 months  and her now 5 y/o for 7 months. Mother reports +breast changes during pregnancy and denies history of breast surgery. Mother awaiting pump approval.     LC to bedside to assess breastfeeding progress and review discharge education. Mother states  is nursing well but has not yet started cluster feeding. Mother denies nipple breakdown but states she has soreness that goes away within the first minute of latching. Lanolin provided by LC as mother did not have any at her bedside. Mother states she is feeling paul today. Mother states she has been offering  bottles of formula intermittently. LC reviewed importance of not offering bottles too frequently as this may make it difficult to latch  back to the breast as well as to protect mother's milk production. Mother states understanding. LC encouraged mother to latch  to the breast with feeding cues in order to support milk production and latching. Mother states understanding. [place] not due to feed at this time as he just received a bottle of formula but LC encouraged mother to call should she wish to have a feed assessed prior to discharge. Mother states understanding.     Discharge education reviewed at this time. Reviewed benefits of breastfeeding, milk production, feeding cues and waking techniques. Reviewed signs of a deep and comfortable latch and how to tell  is eating adequately. Reviewed adequate intake and output. Reviewed cluster feeding and frequency of feeds. Reviewed when to begin pumping and cleaning of pump parts. Reviewed nipple care and how to prevent and treat engorgement, clogged ducts and mastitis. LC encouraged mother to follow up with outpatient lactation. Mother states she will call if needed. LC to follow up with mother regarding pump approval prior to discharge. Ongoing assistance offered prior to discharge. Mother denies questions or concerns at this time.     1230 Per Truman Ayala, mother has been approved  for a breast pump. Mother states she does not have a preference of which pump she receives. Spectra S2 brought to bedside. Mother currently nursing  but declines for LC to assess the latch. Offered ongoing assistance with breastfeeding. Mother denies further questions or concerns at this time.

## 2024-09-16 NOTE — PROGRESS NOTES
Intrapartum Progress Note    Subjective     Objective   Last Vitals:  /78 Temp 36.3 °C (97.3 °F) Pulse 73     Tracing: cat 1 tracing  Ctx: every 1-3 mins    VE: deferred    Pt ambulating. Pit augment when needed.    Katarzyna Levin MD

## 2024-09-16 NOTE — DISCHARGE SUMMARY
Discharge Summary    Admission Date: 2024  Discharge Date: 2024    Discharge Diagnosis   (spontaneous vaginal delivery) ([facility])  Vaginal birth after  section  Gestational hypertension    Hospital Course  Delivery Date: 2024 10:38 PM  Delivery type: Vaginal, Spontaneous   Vaginal birth after  section  GA at delivery: 39w4d  Outcome: Living  Anesthesia during delivery: Epidural  Intrapartum complications: None  Feeding method: Breastfeeding Status: Yes     Procedures:  vaginal birth after  section    45 yo  presented at 39 4/7 weeks, SROM.  Mild range BP on admit. Nl PEC labs. Diagnosed GHTN this admission.  S/p successful , baby boy.  Home on BP monitoring.       Last Vitals:  Temp Pulse Resp BP MAP Pulse Ox   36.3 °C (97.3 °F) 73 18 131/78 100 (!) 95 %     Discharge Meds     Your medication list        START taking these medications        Instructions Last Dose Given Next Dose Due   acetaminophen 500 mg tablet  Commonly known as: Tylenol      Take 2 tablets (1,000 mg) by mouth every 6 hours if needed for mild pain (1 - 3).       ibuprofen 200 mg tablet      Take 3 tablets (600 mg) by mouth every 6 hours.              CONTINUE taking these medications        Instructions Last Dose Given Next Dose Due   Prenatal Vitamin 27 mg iron- 800 mcg tablet  Generic drug: prenatal vit no.124-iron-folic      Take one tablet by mouth daily                 Where to Get Your Medications        You can get these medications from any pharmacy    You don't need a prescription for these medications  acetaminophen 500 mg tablet  ibuprofen 200 mg tablet          Complications Requiring Follow-Up  Discharged home on BP monitoring.  Followup 5-7 days BP check.    Test Results Pending At Discharge  Pending Labs       Order Current Status    Surgical Pathology Exam - [facility] In process            Outpatient Follow-Up  Future Appointments   Date Time Provider Department Center   2024   8:00 AM Avelina Huynh DO [id number] [place]         Katarzyna Levin MD

## 2024-09-16 NOTE — CARE PLAN
Problem: Pain - Adult  Goal: Verbalizes/displays adequate comfort level or baseline comfort level  Outcome: Progressing     Problem: Postpartum  Goal: Experiences normal postpartum course  Outcome: Progressing  Goal: Appropriate maternal -  bonding  Outcome: Progressing  Goal: Establish and maintain infant feeding pattern for adequate nutrition  Outcome: Progressing  Goal: Incisions, wounds, or drain sites healing without S/S of infection  Outcome: Progressing  Goal: No s/sx infection  Outcome: Progressing  Goal: No s/sx of hemorrhage  Outcome: Progressing  Goal: Minimal s/sx of HDP and BP<160/110  Outcome: Progressing     Problem: Safety - Adult  Goal: Free from fall injury  Outcome: Progressing     Problem: Discharge Planning  Goal: Discharge to home or other facility with appropriate resources  Outcome: Progressing

## 2024-09-16 NOTE — PROGRESS NOTES
Postpartum Progress Note    Assessment/Plan   Janett Ray is a 44 y.o.,  who delivered at 39w4d and is now postpartum day 2.    GHTN diagnosed this admission. BP max in 24 hours 140/90. Nl PEC labs  Doing well, no complications overnight.  Discharge home.  BP monitoring twice daily. Parameters reviewed.  BP check one week. Postpartum visit 6 weeks.    Subjective   Feels well. No PEC signs  Wants to go home.  Breastfeeding going well.       Objective   Allergies:   Patient has no known allergies.    Last Vitals:  Temp Pulse Resp BP MAP Pulse Ox   36.3 °C (97.3 °F) 73 18 131/78   (!) 95 %     Vitals Min/Max Last 24 Hours:  Temp  Min: 36.3 °C (97.3 °F)  Max: 37 °C (98.6 °F)  Pulse  Min: 73  Max: 98  Resp  Min: 16  Max: 18  BP  Min: 114/76  Max: 140/90      Physical Exam:  General: no acute distress; she is awake and alert  Lungs: Normal respiratory effort  Abdomen: soft, non-tender, no distention  Fundus: firm  Extremities: no erythema, normal movements  Psychological: appropriate mood and behavior    Lab Data:  Lab Results   Component Value Date    WBC 8.6 2024    HGB 12.4 2024    HCT 38.3 2024     2024     Lab Results   Component Value Date    GLUCOSE 70 (L) 2024     2024    K 3.8 2024     2024    CO2 21 2024    ANIONGAP 18 2024    BUN 11 2024    CREATININE 0.61 2024    EGFR >90 2024    CALCIUM 9.1 2024    ALBUMIN 3.5 2024    PROT 6.3 (L) 2024    ALKPHOS 141 (H) 2024    ALT 10 2024    AST 14 2024    BILITOT 0.2 2024     0   Lab Value Date/Time    UTPCR 0.17 2024 1705    UTPCR 0.06 2024 1659    UTPCR 0.20 (H) 2019 0832        Katarzyna Levin MD

## 2024-09-19 ENCOUNTER — POSTPARTUM VISIT (OUTPATIENT)
Dept: OBSTETRICS AND GYNECOLOGY | Facility: CLINIC | Age: 44
End: 2024-09-19
Payer: COMMERCIAL

## 2024-09-19 ENCOUNTER — APPOINTMENT (OUTPATIENT)
Dept: OBSTETRICS AND GYNECOLOGY | Facility: CLINIC | Age: 44
End: 2024-09-19
Payer: COMMERCIAL

## 2024-09-19 VITALS
DIASTOLIC BLOOD PRESSURE: 92 MMHG | SYSTOLIC BLOOD PRESSURE: 126 MMHG | HEIGHT: 67 IN | WEIGHT: 222.8 LBS | BODY MASS INDEX: 34.97 KG/M2

## 2024-09-19 DIAGNOSIS — Z01.30 BLOOD PRESSURE CHECK: ICD-10-CM

## 2024-09-19 NOTE — PROGRESS NOTES
Postpartum Progress Note    Assessment/Plan   Janett Ray is a 44 y.o., , who delivered at 39w4d gestation and is now postpartum day 5.  Presents to office today for BP check.   on : 39 weeks, , true knot in cord, second degree lac.   Diagnosed with GHTN inpatient.  Discharged home on , not on medication with plan for home blood pressure monitoring and returning to office for BP check.   Mild range BP's at home 130-140s/90s.  Denies HA's, vision changes, abdominal pain, SOB, CP.   She is feeling well. Breastfeeding, no issues. Bleeding light.     Assessment & Plan    Pregnancy Problems (from 24 to present)       Problem Noted Resolved    Gestational hypertension without significant proteinuria, postpartum ([facility]) 2024 by Edwin Staley MD No    Supervision of other normal pregnancy, antepartum ([facility]) 2024 by Caitlin Smith MD 2024 by Edwin Staley MD    AMA (advanced maternal age) multigravida 35+, third trimester ([facility]) 2024 by Caitlin Smith MD 2024 by Edwin Staley MD          Hospital course: gestational hypertension       Subjective   Her pain is well controlled, no medications  She is ambulating well  She is tolerating a regular diet  She reports no breast or nursing problems  She denies emotional concerns today   Her plan for contraception is undecided    Objective   Allergies:   Patient has no known allergies.         Last Vitals:  Temp Pulse Resp BP MAP Pulse Ox         (!) 126/92         Physical Exam:  Physical Exam  Constitutional:       Appearance: Normal appearance.   HENT:      Head: Normocephalic.      Nose: Nose normal.   Cardiovascular:      Rate and Rhythm: Normal rate and regular rhythm.   Pulmonary:      Effort: Pulmonary effort is normal.      Breath sounds: Normal breath sounds.   Abdominal:      General: Abdomen is flat.      Palpations: Abdomen is soft.   Musculoskeletal:         General: Normal range of motion.       Cervical back: Normal range of motion and neck supple.   Neurological:      Mental Status: She is alert.   Psychiatric:         Mood and Affect: Mood normal.         Behavior: Behavior normal.        Assessment/Plan:  1. Gestational hypertension without significant proteinuria, postpartum ([facility])  2. Blood pressure check  -BP mild range in office and at home. Patient without abnormal signs/symptoms.  -Continue home BP monitoring. Parameters to call reviewed.  -Warning signs and symptoms reviewed.  -Follow up routine postpartum visit 6 weeks or sooner if needed.

## 2024-09-20 LAB
LABORATORY COMMENT REPORT: NORMAL
PATH REPORT.FINAL DX SPEC: NORMAL
PATH REPORT.GROSS SPEC: NORMAL
PATH REPORT.RELEVANT HX SPEC: NORMAL
PATH REPORT.TOTAL CANCER: NORMAL

## 2024-10-15 NOTE — L&D DELIVERY NOTE
OB Delivery Note  2024  Janett Ray  44 y.o.   Vaginal, Spontaneous     39 wks PROM  IOL with pitocin    True knot in the cord  Second degree    Gestational Age: 39w4d  /Para:   Quantitative Blood Loss: Admission to Discharge: 0 mL (2024  3:20 PM - 2024 11:23 PM)    Jerri Ray [[id number]]      Labor Events    Rupture date/time: 2024 0300  Rupture type: Spontaneous  Fluid color: Clear  Fluid odor: None  Labor type: Induced Onset of Labor  Labor allowed to proceed with plans for an attempted vaginal birth?: Yes  Induction: Oxytocin  Induction indications: Premature ROM  Complications: None       Labor Length    3rd stage: 0h 06m       Placenta    Placenta delivery date/time: 2024 2244  Placenta removal: Spontaneous  Placenta appearance: Intact  Placenta disposition: lab       Cord    Complications: Knot  Delayed cord clamping?: Yes       Lacerations    Episiotomy: None  Perineal laceration: 2nd  Periurethral laceration?: Yes  Periurethral laceration repaired?: Yes       Anesthesia    Method: Epidural       Operative Delivery    Forceps attempted?: No  Vacuum extractor attempted?: No       Shoulder Dystocia    Shoulder dystocia present?: No        Delivery    Birth date/time: 2024 22:38:00  Delivery type: Vaginal, Spontaneous  Complications: None       Resuscitation    Method: Suctioning, Tactile stimulation       Apgars    Living status: Living  Apgar Component Scores:  1 min.:  5 min.:  10 min.:  15 min.:  20 min.:    Skin color:  1  1       Heart rate:  2  2       Reflex irritability:  1  2       Muscle tone:  1  2       Respiratory effort:  2  2       Total:  7  9       Apgars assigned by: GEOVANI TEJADA       Delivery Providers    Delivering clinician: Edwin Staley MD   Provider Role    Merna Desouza RN Delivery Nurse    Tawny Smith RN Nursery Nurse     Resident                     Edwin Staley MD  
2. The status of comorbities. (See ED/admit documents)

## 2024-10-17 ENCOUNTER — APPOINTMENT (OUTPATIENT)
Dept: RADIOLOGY | Facility: HOSPITAL | Age: 44
End: 2024-10-17
Payer: COMMERCIAL

## 2024-10-17 ENCOUNTER — HOSPITAL ENCOUNTER (EMERGENCY)
Facility: HOSPITAL | Age: 44
Discharge: ANOTHER HOSPITAL | End: 2024-10-17
Attending: FAMILY MEDICINE
Payer: COMMERCIAL

## 2024-10-17 ENCOUNTER — APPOINTMENT (OUTPATIENT)
Dept: CARDIOLOGY | Facility: HOSPITAL | Age: 44
End: 2024-10-17
Payer: COMMERCIAL

## 2024-10-17 VITALS
HEIGHT: 67 IN | WEIGHT: 210 LBS | TEMPERATURE: 98 F | BODY MASS INDEX: 32.96 KG/M2 | DIASTOLIC BLOOD PRESSURE: 85 MMHG | RESPIRATION RATE: 17 BRPM | HEART RATE: 71 BPM | OXYGEN SATURATION: 96 % | SYSTOLIC BLOOD PRESSURE: 114 MMHG

## 2024-10-17 DIAGNOSIS — R06.02 SOB (SHORTNESS OF BREATH): ICD-10-CM

## 2024-10-17 DIAGNOSIS — R11.2 NAUSEA AND VOMITING, UNSPECIFIED VOMITING TYPE: ICD-10-CM

## 2024-10-17 DIAGNOSIS — R07.9 CHEST PAIN, UNSPECIFIED TYPE: Primary | ICD-10-CM

## 2024-10-17 LAB
ALBUMIN SERPL BCP-MCNC: 4.3 G/DL (ref 3.4–5)
ALP SERPL-CCNC: 135 U/L (ref 33–110)
ALT SERPL W P-5'-P-CCNC: 184 U/L (ref 7–45)
ANION GAP SERPL CALC-SCNC: 13 MMOL/L (ref 10–20)
APAP SERPL-MCNC: <10 UG/ML
APPEARANCE UR: CLEAR
AST SERPL W P-5'-P-CCNC: 290 U/L (ref 9–39)
BACTERIA #/AREA URNS AUTO: ABNORMAL /HPF
BASOPHILS # BLD AUTO: 0.02 X10*3/UL (ref 0–0.1)
BASOPHILS NFR BLD AUTO: 0.2 %
BILIRUB SERPL-MCNC: 1.1 MG/DL (ref 0–1.2)
BILIRUB UR STRIP.AUTO-MCNC: NEGATIVE MG/DL
BNP SERPL-MCNC: 29 PG/ML (ref 0–99)
BUN SERPL-MCNC: 10 MG/DL (ref 6–23)
CALCIUM SERPL-MCNC: 9.1 MG/DL (ref 8.6–10.3)
CARDIAC TROPONIN I PNL SERPL HS: <3 NG/L (ref 0–13)
CARDIAC TROPONIN I PNL SERPL HS: <3 NG/L (ref 0–13)
CHLORIDE SERPL-SCNC: 102 MMOL/L (ref 98–107)
CO2 SERPL-SCNC: 29 MMOL/L (ref 21–32)
COLOR UR: ABNORMAL
CREAT SERPL-MCNC: 0.82 MG/DL (ref 0.5–1.05)
D DIMER PPP FEU-MCNC: 1095 NG/ML FEU
EGFRCR SERPLBLD CKD-EPI 2021: >90 ML/MIN/1.73M*2
EOSINOPHIL # BLD AUTO: 0.02 X10*3/UL (ref 0–0.7)
EOSINOPHIL NFR BLD AUTO: 0.2 %
ERYTHROCYTE [DISTWIDTH] IN BLOOD BY AUTOMATED COUNT: 13.6 % (ref 11.5–14.5)
ETHANOL SERPL-MCNC: <10 MG/DL
GLUCOSE SERPL-MCNC: 124 MG/DL (ref 74–99)
GLUCOSE UR STRIP.AUTO-MCNC: NORMAL MG/DL
HCG UR QL IA.RAPID: NEGATIVE
HCT VFR BLD AUTO: 43.8 % (ref 36–46)
HGB BLD-MCNC: 14 G/DL (ref 12–16)
IMM GRANULOCYTES # BLD AUTO: 0.02 X10*3/UL (ref 0–0.7)
IMM GRANULOCYTES NFR BLD AUTO: 0.2 % (ref 0–0.9)
KETONES UR STRIP.AUTO-MCNC: NEGATIVE MG/DL
LDH SERPL L TO P-CCNC: 397 U/L (ref 84–246)
LEUKOCYTE ESTERASE UR QL STRIP.AUTO: ABNORMAL
LYMPHOCYTES # BLD AUTO: 1.41 X10*3/UL (ref 1.2–4.8)
LYMPHOCYTES NFR BLD AUTO: 14.2 %
MCH RBC QN AUTO: 28.1 PG (ref 26–34)
MCHC RBC AUTO-ENTMCNC: 32 G/DL (ref 32–36)
MCV RBC AUTO: 88 FL (ref 80–100)
MONOCYTES # BLD AUTO: 0.53 X10*3/UL (ref 0.1–1)
MONOCYTES NFR BLD AUTO: 5.4 %
NEUTROPHILS # BLD AUTO: 7.9 X10*3/UL (ref 1.2–7.7)
NEUTROPHILS NFR BLD AUTO: 79.8 %
NITRITE UR QL STRIP.AUTO: NEGATIVE
NRBC BLD-RTO: 0 /100 WBCS (ref 0–0)
PH UR STRIP.AUTO: 8 [PH]
PLATELET # BLD AUTO: 342 X10*3/UL (ref 150–450)
POTASSIUM SERPL-SCNC: 3.7 MMOL/L (ref 3.5–5.3)
PROT SERPL-MCNC: 7.6 G/DL (ref 6.4–8.2)
PROT UR STRIP.AUTO-MCNC: NEGATIVE MG/DL
RBC # BLD AUTO: 4.98 X10*6/UL (ref 4–5.2)
RBC # UR STRIP.AUTO: ABNORMAL /UL
RBC #/AREA URNS AUTO: ABNORMAL /HPF
SALICYLATES SERPL-MCNC: <3 MG/DL
SODIUM SERPL-SCNC: 140 MMOL/L (ref 136–145)
SP GR UR STRIP.AUTO: 1.01
SQUAMOUS #/AREA URNS AUTO: ABNORMAL /HPF
UROBILINOGEN UR STRIP.AUTO-MCNC: NORMAL MG/DL
WBC # BLD AUTO: 9.9 X10*3/UL (ref 4.4–11.3)
WBC #/AREA URNS AUTO: ABNORMAL /HPF

## 2024-10-17 PROCEDURE — 71045 X-RAY EXAM CHEST 1 VIEW: CPT | Mod: FOREIGN READ | Performed by: RADIOLOGY

## 2024-10-17 PROCEDURE — 2500000004 HC RX 250 GENERAL PHARMACY W/ HCPCS (ALT 636 FOR OP/ED): Performed by: STUDENT IN AN ORGANIZED HEALTH CARE EDUCATION/TRAINING PROGRAM

## 2024-10-17 PROCEDURE — 80053 COMPREHEN METABOLIC PANEL: CPT | Performed by: FAMILY MEDICINE

## 2024-10-17 PROCEDURE — 36415 COLL VENOUS BLD VENIPUNCTURE: CPT | Performed by: FAMILY MEDICINE

## 2024-10-17 PROCEDURE — 96375 TX/PRO/DX INJ NEW DRUG ADDON: CPT

## 2024-10-17 PROCEDURE — 83615 LACTATE (LD) (LDH) ENZYME: CPT | Performed by: STUDENT IN AN ORGANIZED HEALTH CARE EDUCATION/TRAINING PROGRAM

## 2024-10-17 PROCEDURE — 84484 ASSAY OF TROPONIN QUANT: CPT | Performed by: FAMILY MEDICINE

## 2024-10-17 PROCEDURE — 83880 ASSAY OF NATRIURETIC PEPTIDE: CPT | Performed by: FAMILY MEDICINE

## 2024-10-17 PROCEDURE — 74177 CT ABD & PELVIS W/CONTRAST: CPT | Mod: FOREIGN READ | Performed by: RADIOLOGY

## 2024-10-17 PROCEDURE — 74183 MRI ABD W/O CNTR FLWD CNTR: CPT

## 2024-10-17 PROCEDURE — 80320 DRUG SCREEN QUANTALCOHOLS: CPT | Performed by: STUDENT IN AN ORGANIZED HEALTH CARE EDUCATION/TRAINING PROGRAM

## 2024-10-17 PROCEDURE — A9575 INJ GADOTERATE MEGLUMI 0.1ML: HCPCS | Performed by: STUDENT IN AN ORGANIZED HEALTH CARE EDUCATION/TRAINING PROGRAM

## 2024-10-17 PROCEDURE — 71275 CT ANGIOGRAPHY CHEST: CPT | Mod: FOREIGN READ | Performed by: RADIOLOGY

## 2024-10-17 PROCEDURE — 81025 URINE PREGNANCY TEST: CPT | Performed by: FAMILY MEDICINE

## 2024-10-17 PROCEDURE — 71275 CT ANGIOGRAPHY CHEST: CPT

## 2024-10-17 PROCEDURE — 71045 X-RAY EXAM CHEST 1 VIEW: CPT

## 2024-10-17 PROCEDURE — 74177 CT ABD & PELVIS W/CONTRAST: CPT

## 2024-10-17 PROCEDURE — 81001 URINALYSIS AUTO W/SCOPE: CPT | Performed by: STUDENT IN AN ORGANIZED HEALTH CARE EDUCATION/TRAINING PROGRAM

## 2024-10-17 PROCEDURE — 2550000001 HC RX 255 CONTRASTS: Performed by: STUDENT IN AN ORGANIZED HEALTH CARE EDUCATION/TRAINING PROGRAM

## 2024-10-17 PROCEDURE — 85379 FIBRIN DEGRADATION QUANT: CPT | Performed by: FAMILY MEDICINE

## 2024-10-17 PROCEDURE — 2500000004 HC RX 250 GENERAL PHARMACY W/ HCPCS (ALT 636 FOR OP/ED)

## 2024-10-17 PROCEDURE — 93005 ELECTROCARDIOGRAM TRACING: CPT

## 2024-10-17 PROCEDURE — 85025 COMPLETE CBC W/AUTO DIFF WBC: CPT | Performed by: FAMILY MEDICINE

## 2024-10-17 PROCEDURE — 76705 ECHO EXAM OF ABDOMEN: CPT | Mod: FOREIGN READ | Performed by: RADIOLOGY

## 2024-10-17 PROCEDURE — 99285 EMERGENCY DEPT VISIT HI MDM: CPT | Mod: 25

## 2024-10-17 PROCEDURE — 96365 THER/PROPH/DIAG IV INF INIT: CPT

## 2024-10-17 PROCEDURE — 76705 ECHO EXAM OF ABDOMEN: CPT

## 2024-10-17 RX ORDER — FAMOTIDINE 10 MG/ML
INJECTION INTRAVENOUS
Status: COMPLETED
Start: 2024-10-17 | End: 2024-10-17

## 2024-10-17 RX ORDER — LABETALOL HYDROCHLORIDE 5 MG/ML
20 INJECTION, SOLUTION INTRAVENOUS ONCE
Status: COMPLETED | OUTPATIENT
Start: 2024-10-17 | End: 2024-10-17

## 2024-10-17 RX ORDER — ONDANSETRON HYDROCHLORIDE 2 MG/ML
INJECTION, SOLUTION INTRAVENOUS
Status: COMPLETED
Start: 2024-10-17 | End: 2024-10-17

## 2024-10-17 RX ORDER — ONDANSETRON HYDROCHLORIDE 2 MG/ML
4 INJECTION, SOLUTION INTRAVENOUS ONCE
Status: COMPLETED | OUTPATIENT
Start: 2024-10-17 | End: 2024-10-17

## 2024-10-17 RX ORDER — FAMOTIDINE 10 MG/ML
20 INJECTION INTRAVENOUS ONCE
Status: COMPLETED | OUTPATIENT
Start: 2024-10-17 | End: 2024-10-17

## 2024-10-17 RX ORDER — GADOTERATE MEGLUMINE 376.9 MG/ML
19 INJECTION INTRAVENOUS
Status: COMPLETED | OUTPATIENT
Start: 2024-10-17 | End: 2024-10-17

## 2024-10-17 RX ADMIN — FAMOTIDINE 20 MG: 10 INJECTION INTRAVENOUS at 05:57

## 2024-10-17 RX ADMIN — ONDANSETRON 4 MG: 2 INJECTION INTRAMUSCULAR; INTRAVENOUS at 05:56

## 2024-10-17 RX ADMIN — ONDANSETRON HYDROCHLORIDE 4 MG: 2 INJECTION, SOLUTION INTRAVENOUS at 05:56

## 2024-10-17 RX ADMIN — PIPERACILLIN SODIUM AND TAZOBACTAM SODIUM 3.38 G: 3; .375 INJECTION, SOLUTION INTRAVENOUS at 19:49

## 2024-10-17 RX ADMIN — GADOTERATE MEGLUMINE 19 ML: 376.9 INJECTION INTRAVENOUS at 14:13

## 2024-10-17 RX ADMIN — IOHEXOL 75 ML: 350 INJECTION, SOLUTION INTRAVENOUS at 07:39

## 2024-10-17 RX ADMIN — LABETALOL HYDROCHLORIDE 20 MG: 5 INJECTION, SOLUTION INTRAVENOUS at 09:58

## 2024-10-17 ASSESSMENT — PAIN SCALES - GENERAL
PAINLEVEL_OUTOF10: 8
PAINLEVEL_OUTOF10: 8

## 2024-10-17 ASSESSMENT — COLUMBIA-SUICIDE SEVERITY RATING SCALE - C-SSRS
2. HAVE YOU ACTUALLY HAD ANY THOUGHTS OF KILLING YOURSELF?: NO
1. IN THE PAST MONTH, HAVE YOU WISHED YOU WERE DEAD OR WISHED YOU COULD GO TO SLEEP AND NOT WAKE UP?: NO
6. HAVE YOU EVER DONE ANYTHING, STARTED TO DO ANYTHING, OR PREPARED TO DO ANYTHING TO END YOUR LIFE?: NO

## 2024-10-17 ASSESSMENT — PAIN - FUNCTIONAL ASSESSMENT: PAIN_FUNCTIONAL_ASSESSMENT: 0-10

## 2024-10-17 ASSESSMENT — PAIN DESCRIPTION - LOCATION: LOCATION: CHEST

## 2024-10-17 ASSESSMENT — PAIN DESCRIPTION - PAIN TYPE: TYPE: ACUTE PAIN

## 2024-10-17 ASSESSMENT — PAIN DESCRIPTION - ORIENTATION: ORIENTATION: MID

## 2024-10-17 ASSESSMENT — PAIN DESCRIPTION - DESCRIPTORS
DESCRIPTORS: ACHING

## 2024-10-17 NOTE — ED TRIAGE NOTES
Pt presents to the ED from home with her . She states she has mid sternal CP that started last night around 9pm. Pt also states she vomited at home. Pt denies any cardiac history, states she did have a baby 1 month ago.

## 2024-10-17 NOTE — ED PROVIDER NOTES
HPI   Chief Complaint   Patient presents with    Chest Pain     Pt states that she has mid sternal chest pain that started at 9pm last night. No cardiac hx per pt, she did have a baby 1 month ago, normal pregnancy/delivery.       44-year-old female postpartum 1 month comes to the ED with complaint of sudden onset substernal chest pain with radiation to her jaw that began while she was attempting to try to sleep tonight.  Patient reports shortly thereafter she also got nauseous and vomited and the pain has continued to persist.  Patient also reported some shortness of breath at the time of the onset of the chest pain.  She told her family and they brought her to the ED for evaluation.  Patient in the ED is alert, cooperative, appears anxious, uncomfortable, but in no distress.  Patient reports that her pain is 5/10 and feels heavy/sharp.  Patient reports no other associate symptoms or complaints at this time.  Patient does report that she took some aspirin prior to arrival in hopes to aid her chest pain.      History provided by:  Patient, medical records and spouse   used: No            Patient History   Past Medical History:   Diagnosis Date    Abnormal glucose complicating pregnancy ([facility])     Abnormal glucose tolerance in pregnancy    Encounter for full-term uncomplicated delivery ([facility]) 2019     (spontaneous vaginal delivery)    Encounter for other specified surgical aftercare 2019    Encounter for postoperative wound check    Gestational diabetes ([facility]) 08/10/2019    Personal history of gestational diabetes 2019    History of gestational diabetes mellitus (GDM)    Personal history of other specified conditions 2019    History of abnormal Pap smear     Past Surgical History:   Procedure Laterality Date    OTHER SURGICAL HISTORY  03/15/2019    Colposcopy     No family history on file.  Social History     Tobacco Use    Smoking status: Never    Smokeless  tobacco: Never   Vaping Use    Vaping status: Never Used   Substance Use Topics    Alcohol use: Never    Drug use: Never       Physical Exam   ED Triage Vitals [10/17/24 0517]   Temperature Heart Rate Respirations BP   36.7 °C (98 °F) 89 16 (!) 149/102      Pulse Ox Temp Source Heart Rate Source Patient Position   99 % Temporal Monitor Sitting      BP Location FiO2 (%)     Right arm --       Physical Exam  Vitals and nursing note reviewed. Exam conducted with a chaperone present.   Constitutional:       General: She is not in acute distress.     Appearance: She is well-developed.   HENT:      Head: Normocephalic and atraumatic.   Eyes:      Conjunctiva/sclera: Conjunctivae normal.   Cardiovascular:      Rate and Rhythm: Normal rate and regular rhythm.      Pulses: Normal pulses.      Heart sounds: Normal heart sounds, S1 normal and S2 normal. No murmur heard.  Pulmonary:      Effort: Pulmonary effort is normal. No respiratory distress.      Breath sounds: Normal breath sounds and air entry.   Chest:      Chest wall: No mass, lacerations, deformity, swelling, tenderness, crepitus or edema. There is no dullness to percussion.   Abdominal:      Palpations: Abdomen is soft.      Tenderness: There is no abdominal tenderness.   Musculoskeletal:         General: No swelling.      Cervical back: Neck supple.   Skin:     General: Skin is warm and dry.      Capillary Refill: Capillary refill takes less than 2 seconds.   Neurological:      Mental Status: She is alert.   Psychiatric:         Mood and Affect: Mood normal.           ED Course & MDM   Diagnoses as of 10/17/24 0610   Chest pain, unspecified type   Nausea and vomiting, unspecified vomiting type   SOB (shortness of breath)                 No data recorded     Alena Coma Scale Score: 15 (10/17/24 0518 : Annmarie Lemons RN)                       Labs Reviewed   CBC WITH AUTO DIFFERENTIAL - Abnormal       Result Value    WBC 9.9      nRBC 0.0      RBC 4.98       Hemoglobin 14.0      Hematocrit 43.8      MCV 88      MCH 28.1      MCHC 32.0      RDW 13.6      Platelets 342      Neutrophils % 79.8      Immature Granulocytes %, Automated 0.2      Lymphocytes % 14.2      Monocytes % 5.4      Eosinophils % 0.2      Basophils % 0.2      Neutrophils Absolute 7.90 (*)     Immature Granulocytes Absolute, Automated 0.02      Lymphocytes Absolute 1.41      Monocytes Absolute 0.53      Eosinophils Absolute 0.02      Basophils Absolute 0.02     D-DIMER, VTE EXCLUSION - Abnormal    D-Dimer, Quantitative VTE Exclusion 1,095 (*)     Narrative:     The VTE Exclusion D-Dimer assay is reported in ng/mL Fibrinogen Equivalent Units (FEU).    Per 's instructions for use, a value of less than 500 ng/mL (FEU) may help to exclude DVT or PE in outpatients when the assay is used with a clinical pretest probability assessment.([facility] must utilize and document eCalc 'Wells Score Deep Vein Thrombosis Risk' for DVT exclusion only. Emergency Department should utilize  Guidelines for Emergency Department Use of the VTE Exclusion D-Dimer and Clinical Pretest probability assessment model for DVT or PE exclusion.)   HCG, URINE, QUALITATIVE   COMPREHENSIVE METABOLIC PANEL   B-TYPE NATRIURETIC PEPTIDE   TROPONIN SERIES- (INITIAL, 1 HR)    Narrative:     The following orders were created for panel order Troponin I Series, High Sensitivity (0, 1 HR).  Procedure                               Abnormality         Status                     ---------                               -----------         ------                     Troponin I, High Sensiti...[[id number]]                      In process                 Troponin, High Sensitivi...[[id number]]                                                   Please view results for these tests on the individual orders.   SERIAL TROPONIN-INITIAL   SERIAL TROPONIN, 1 HOUR     XR chest 1 view    (Results Pending)     0520 -- NSR rate 90.  Normal axis.  Normal  intervals.  No ST-T changes or signs of ischemia.  Normal EKG with no findings of STEMI.  No old EKG      Medical Decision Making  Pt upon arrival to the ED appeared to be anxious and uncomfortable but in no distress with stable vitals.  Discussed with pt the presenting complaints and clinically findings.  Reviewed with pt the epic chart and counseled the patient on chest pain and appropriate approach to management/treatments.  After assessment and evaluation IV line was started, labs sent, imaging ordered, EKG reviewed, given IV Zofran, placed on cardiac monitor, and observed.  At this time care was transferred over to oncoming provider Dr. Meng.      Amount and/or Complexity of Data Reviewed  External Data Reviewed: labs, radiology, ECG and notes.  Labs: ordered. Decision-making details documented in ED Course.  Radiology: ordered. Decision-making details documented in ED Course.  ECG/medicine tests: ordered and independent interpretation performed. Decision-making details documented in ED Course.        Procedure  Procedures     Bautista Saldaña MD  10/17/24 0613

## 2024-10-17 NOTE — PROGRESS NOTES
Patient was signed out to me at change of shift by the previous ED team.  Please see previous provider's note for complete history and physical exam.    Briefly, this is a 44-year-old female, presenting to the emergency department for evaluation of chest pain that radiates up to her jaw.  She is 1 month postpartum.  She denies any complications with the pregnancy.  Prior to signout, patient had lab work and imaging which was concerning for an elevated dimer, was otherwise grossly unremarkable.  CT abdomen pelvis was obtained, and did show cholelithiasis with mild gallbladder wall thickening.  CT PE study was also obtained, without any evidence of pulmonary embolism.  Ultrasound of the gallbladder was also obtained and again was consistent with cholelithiasis and concern for possible cholecystitis, with dilation of the common bile duct.  I did reach out to GI, who would like the patient to have an MRCP.  MRCP was obtained, and does not show any evidence of choledocholithiasis.  I reached out to general surgery at [facility], who ultimately accepted the patient in admission for cholecystitis.  She was transferred in stable condition.      Impression: Cholecystitis  Disposition: Transfer  Condition: Stable    Caitlin Moraes,   Emergency Medicine

## 2024-10-18 ENCOUNTER — ANESTHESIA (OUTPATIENT)
Dept: OPERATING ROOM | Facility: HOSPITAL | Age: 44
End: 2024-10-18
Payer: COMMERCIAL

## 2024-10-18 ENCOUNTER — HOSPITAL ENCOUNTER (OUTPATIENT)
Facility: HOSPITAL | Age: 44
Setting detail: OBSERVATION
Discharge: HOME | End: 2024-10-19
Attending: INTERNAL MEDICINE | Admitting: INTERNAL MEDICINE
Payer: COMMERCIAL

## 2024-10-18 ENCOUNTER — ANESTHESIA EVENT (OUTPATIENT)
Dept: OPERATING ROOM | Facility: HOSPITAL | Age: 44
End: 2024-10-18
Payer: COMMERCIAL

## 2024-10-18 DIAGNOSIS — K81.9 CHOLECYSTITIS: ICD-10-CM

## 2024-10-18 PROBLEM — K80.21 CHOLELITHIASIS WITH BILIARY OBSTRUCTION: Status: ACTIVE | Noted: 2024-10-18

## 2024-10-18 LAB
ALBUMIN SERPL BCP-MCNC: 3.6 G/DL (ref 3.4–5)
ALP SERPL-CCNC: 262 U/L (ref 33–110)
ALT SERPL W P-5'-P-CCNC: 678 U/L (ref 7–45)
ANION GAP SERPL CALC-SCNC: 12 MMOL/L (ref 10–20)
APPEARANCE UR: ABNORMAL
AST SERPL W P-5'-P-CCNC: 678 U/L (ref 9–39)
BACTERIA #/AREA URNS AUTO: ABNORMAL /HPF
BILIRUB SERPL-MCNC: 1.6 MG/DL (ref 0–1.2)
BILIRUB UR STRIP.AUTO-MCNC: ABNORMAL MG/DL
BUN SERPL-MCNC: 9 MG/DL (ref 6–23)
CALCIUM SERPL-MCNC: 8.6 MG/DL (ref 8.6–10.3)
CHLORIDE SERPL-SCNC: 104 MMOL/L (ref 98–107)
CO2 SERPL-SCNC: 29 MMOL/L (ref 21–32)
COLOR UR: ABNORMAL
CREAT SERPL-MCNC: 0.94 MG/DL (ref 0.5–1.05)
EGFRCR SERPLBLD CKD-EPI 2021: 78 ML/MIN/1.73M*2
ERYTHROCYTE [DISTWIDTH] IN BLOOD BY AUTOMATED COUNT: 13.8 % (ref 11.5–14.5)
GLUCOSE SERPL-MCNC: 96 MG/DL (ref 74–99)
GLUCOSE UR STRIP.AUTO-MCNC: NORMAL MG/DL
HCT VFR BLD AUTO: 42.6 % (ref 36–46)
HGB BLD-MCNC: 13.7 G/DL (ref 12–16)
KETONES UR STRIP.AUTO-MCNC: NEGATIVE MG/DL
LEUKOCYTE ESTERASE UR QL STRIP.AUTO: ABNORMAL
MCH RBC QN AUTO: 28.3 PG (ref 26–34)
MCHC RBC AUTO-ENTMCNC: 32.2 G/DL (ref 32–36)
MCV RBC AUTO: 88 FL (ref 80–100)
MUCOUS THREADS #/AREA URNS AUTO: ABNORMAL /LPF
NITRITE UR QL STRIP.AUTO: NEGATIVE
NRBC BLD-RTO: 0 /100 WBCS (ref 0–0)
PH UR STRIP.AUTO: 6.5 [PH]
PLATELET # BLD AUTO: 283 X10*3/UL (ref 150–450)
POTASSIUM SERPL-SCNC: 3.6 MMOL/L (ref 3.5–5.3)
PROT SERPL-MCNC: 6.6 G/DL (ref 6.4–8.2)
PROT UR STRIP.AUTO-MCNC: ABNORMAL MG/DL
RBC # BLD AUTO: 4.84 X10*6/UL (ref 4–5.2)
RBC # UR STRIP.AUTO: ABNORMAL /UL
RBC #/AREA URNS AUTO: ABNORMAL /HPF
SODIUM SERPL-SCNC: 141 MMOL/L (ref 136–145)
SP GR UR STRIP.AUTO: 1.03
SQUAMOUS #/AREA URNS AUTO: ABNORMAL /HPF
UROBILINOGEN UR STRIP.AUTO-MCNC: ABNORMAL MG/DL
WBC # BLD AUTO: 5.8 X10*3/UL (ref 4.4–11.3)
WBC #/AREA URNS AUTO: ABNORMAL /HPF

## 2024-10-18 PROCEDURE — 2500000002 HC RX 250 W HCPCS SELF ADMINISTERED DRUGS (ALT 637 FOR MEDICARE OP, ALT 636 FOR OP/ED): Performed by: SURGERY

## 2024-10-18 PROCEDURE — 2500000002 HC RX 250 W HCPCS SELF ADMINISTERED DRUGS (ALT 637 FOR MEDICARE OP, ALT 636 FOR OP/ED): Performed by: INTERNAL MEDICINE

## 2024-10-18 PROCEDURE — G0378 HOSPITAL OBSERVATION PER HR: HCPCS

## 2024-10-18 PROCEDURE — 88304 TISSUE EXAM BY PATHOLOGIST: CPT | Performed by: STUDENT IN AN ORGANIZED HEALTH CARE EDUCATION/TRAINING PROGRAM

## 2024-10-18 PROCEDURE — 87086 URINE CULTURE/COLONY COUNT: CPT | Mod: GEALAB | Performed by: INTERNAL MEDICINE

## 2024-10-18 PROCEDURE — 81001 URINALYSIS AUTO W/SCOPE: CPT | Performed by: INTERNAL MEDICINE

## 2024-10-18 PROCEDURE — 2500000005 HC RX 250 GENERAL PHARMACY W/O HCPCS: Performed by: NURSE PRACTITIONER

## 2024-10-18 PROCEDURE — 88304 TISSUE EXAM BY PATHOLOGIST: CPT | Mod: TC,GEALAB | Performed by: SURGERY

## 2024-10-18 PROCEDURE — 3700000001 HC GENERAL ANESTHESIA TIME - INITIAL BASE CHARGE: Performed by: SURGERY

## 2024-10-18 PROCEDURE — 3600000008 HC OR TIME - EACH INCREMENTAL 1 MINUTE - PROCEDURE LEVEL THREE: Performed by: SURGERY

## 2024-10-18 PROCEDURE — 2500000004 HC RX 250 GENERAL PHARMACY W/ HCPCS (ALT 636 FOR OP/ED): Performed by: INTERNAL MEDICINE

## 2024-10-18 PROCEDURE — 3600000003 HC OR TIME - INITIAL BASE CHARGE - PROCEDURE LEVEL THREE: Performed by: SURGERY

## 2024-10-18 PROCEDURE — 47562 LAPAROSCOPIC CHOLECYSTECTOMY: CPT | Performed by: SURGERY

## 2024-10-18 PROCEDURE — 36415 COLL VENOUS BLD VENIPUNCTURE: CPT | Performed by: INTERNAL MEDICINE

## 2024-10-18 PROCEDURE — 2500000004 HC RX 250 GENERAL PHARMACY W/ HCPCS (ALT 636 FOR OP/ED): Performed by: SURGERY

## 2024-10-18 PROCEDURE — 96372 THER/PROPH/DIAG INJ SC/IM: CPT | Mod: 59 | Performed by: INTERNAL MEDICINE

## 2024-10-18 PROCEDURE — 2500000004 HC RX 250 GENERAL PHARMACY W/ HCPCS (ALT 636 FOR OP/ED): Performed by: NURSE ANESTHETIST, CERTIFIED REGISTERED

## 2024-10-18 PROCEDURE — 2720000007 HC OR 272 NO HCPCS: Performed by: SURGERY

## 2024-10-18 PROCEDURE — 7100000002 HC RECOVERY ROOM TIME - EACH INCREMENTAL 1 MINUTE: Performed by: SURGERY

## 2024-10-18 PROCEDURE — 99223 1ST HOSP IP/OBS HIGH 75: CPT | Performed by: NURSE PRACTITIONER

## 2024-10-18 PROCEDURE — 94760 N-INVAS EAR/PLS OXIMETRY 1: CPT | Mod: 59

## 2024-10-18 PROCEDURE — 7100000001 HC RECOVERY ROOM TIME - INITIAL BASE CHARGE: Performed by: SURGERY

## 2024-10-18 PROCEDURE — C1889 IMPLANT/INSERT DEVICE, NOC: HCPCS | Performed by: SURGERY

## 2024-10-18 PROCEDURE — 99223 1ST HOSP IP/OBS HIGH 75: CPT | Performed by: INTERNAL MEDICINE

## 2024-10-18 PROCEDURE — 99232 SBSQ HOSP IP/OBS MODERATE 35: CPT | Performed by: INTERNAL MEDICINE

## 2024-10-18 PROCEDURE — 84075 ASSAY ALKALINE PHOSPHATASE: CPT | Performed by: INTERNAL MEDICINE

## 2024-10-18 PROCEDURE — 85027 COMPLETE CBC AUTOMATED: CPT | Performed by: INTERNAL MEDICINE

## 2024-10-18 PROCEDURE — 2780000003 HC OR 278 NO HCPCS: Performed by: SURGERY

## 2024-10-18 PROCEDURE — 2500000004 HC RX 250 GENERAL PHARMACY W/ HCPCS (ALT 636 FOR OP/ED): Performed by: STUDENT IN AN ORGANIZED HEALTH CARE EDUCATION/TRAINING PROGRAM

## 2024-10-18 PROCEDURE — 2500000005 HC RX 250 GENERAL PHARMACY W/O HCPCS: Performed by: STUDENT IN AN ORGANIZED HEALTH CARE EDUCATION/TRAINING PROGRAM

## 2024-10-18 PROCEDURE — 2500000001 HC RX 250 WO HCPCS SELF ADMINISTERED DRUGS (ALT 637 FOR MEDICARE OP): Performed by: INTERNAL MEDICINE

## 2024-10-18 PROCEDURE — 3700000002 HC GENERAL ANESTHESIA TIME - EACH INCREMENTAL 1 MINUTE: Performed by: SURGERY

## 2024-10-18 RX ORDER — ACETAMINOPHEN 160 MG/5ML
650 SOLUTION ORAL EVERY 4 HOURS PRN
Status: DISCONTINUED | OUTPATIENT
Start: 2024-10-18 | End: 2024-10-19 | Stop reason: HOSPADM

## 2024-10-18 RX ORDER — ACETAMINOPHEN 650 MG/1
650 SUPPOSITORY RECTAL EVERY 4 HOURS PRN
Status: DISCONTINUED | OUTPATIENT
Start: 2024-10-18 | End: 2024-10-19 | Stop reason: HOSPADM

## 2024-10-18 RX ORDER — ONDANSETRON 4 MG/1
4 TABLET, FILM COATED ORAL EVERY 8 HOURS PRN
Status: DISCONTINUED | OUTPATIENT
Start: 2024-10-18 | End: 2024-10-19 | Stop reason: HOSPADM

## 2024-10-18 RX ORDER — KETAMINE HYDROCHLORIDE 50 MG/ML
INJECTION, SOLUTION INTRAMUSCULAR; INTRAVENOUS AS NEEDED
Status: DISCONTINUED | OUTPATIENT
Start: 2024-10-18 | End: 2024-10-18

## 2024-10-18 RX ORDER — ONDANSETRON HYDROCHLORIDE 2 MG/ML
4 INJECTION, SOLUTION INTRAVENOUS ONCE AS NEEDED
Status: COMPLETED | OUTPATIENT
Start: 2024-10-18 | End: 2024-10-18

## 2024-10-18 RX ORDER — DEXTROSE MONOHYDRATE AND SODIUM CHLORIDE 5; .9 G/100ML; G/100ML
75 INJECTION, SOLUTION INTRAVENOUS CONTINUOUS
Status: ACTIVE | OUTPATIENT
Start: 2024-10-18 | End: 2024-10-19

## 2024-10-18 RX ORDER — ACETAMINOPHEN 325 MG/1
650 TABLET ORAL EVERY 4 HOURS PRN
Status: DISCONTINUED | OUTPATIENT
Start: 2024-10-18 | End: 2024-10-19 | Stop reason: HOSPADM

## 2024-10-18 RX ORDER — ROCURONIUM BROMIDE 10 MG/ML
INJECTION, SOLUTION INTRAVENOUS AS NEEDED
Status: DISCONTINUED | OUTPATIENT
Start: 2024-10-18 | End: 2024-10-18

## 2024-10-18 RX ORDER — BUPIVACAINE HYDROCHLORIDE 5 MG/ML
INJECTION, SOLUTION PERINEURAL AS NEEDED
Status: DISCONTINUED | OUTPATIENT
Start: 2024-10-18 | End: 2024-10-18 | Stop reason: HOSPADM

## 2024-10-18 RX ORDER — LABETALOL HYDROCHLORIDE 5 MG/ML
5 INJECTION, SOLUTION INTRAVENOUS ONCE AS NEEDED
Status: DISCONTINUED | OUTPATIENT
Start: 2024-10-18 | End: 2024-10-18 | Stop reason: HOSPADM

## 2024-10-18 RX ORDER — HYDRALAZINE HYDROCHLORIDE 20 MG/ML
5 INJECTION INTRAMUSCULAR; INTRAVENOUS EVERY 30 MIN PRN
Status: DISCONTINUED | OUTPATIENT
Start: 2024-10-18 | End: 2024-10-18 | Stop reason: HOSPADM

## 2024-10-18 RX ORDER — KETOROLAC TROMETHAMINE 30 MG/ML
INJECTION, SOLUTION INTRAMUSCULAR; INTRAVENOUS AS NEEDED
Status: DISCONTINUED | OUTPATIENT
Start: 2024-10-18 | End: 2024-10-18

## 2024-10-18 RX ORDER — NITROFURANTOIN 25; 75 MG/1; MG/1
100 CAPSULE ORAL EVERY 12 HOURS SCHEDULED
Status: DISCONTINUED | OUTPATIENT
Start: 2024-10-18 | End: 2024-10-19 | Stop reason: HOSPADM

## 2024-10-18 RX ORDER — CEFAZOLIN SODIUM 2 G/100ML
2 INJECTION, SOLUTION INTRAVENOUS ONCE
Status: DISCONTINUED | OUTPATIENT
Start: 2024-10-18 | End: 2024-10-19 | Stop reason: HOSPADM

## 2024-10-18 RX ORDER — CEFAZOLIN 1 G/1
INJECTION, POWDER, FOR SOLUTION INTRAVENOUS AS NEEDED
Status: DISCONTINUED | OUTPATIENT
Start: 2024-10-18 | End: 2024-10-18

## 2024-10-18 RX ORDER — DROPERIDOL 2.5 MG/ML
0.62 INJECTION, SOLUTION INTRAMUSCULAR; INTRAVENOUS ONCE AS NEEDED
Status: DISCONTINUED | OUTPATIENT
Start: 2024-10-18 | End: 2024-10-18 | Stop reason: HOSPADM

## 2024-10-18 RX ORDER — ACETAMINOPHEN 325 MG/1
650 TABLET ORAL EVERY 4 HOURS PRN
Status: DISCONTINUED | OUTPATIENT
Start: 2024-10-18 | End: 2024-10-18 | Stop reason: HOSPADM

## 2024-10-18 RX ORDER — MIDAZOLAM HYDROCHLORIDE 1 MG/ML
INJECTION, SOLUTION INTRAMUSCULAR; INTRAVENOUS AS NEEDED
Status: DISCONTINUED | OUTPATIENT
Start: 2024-10-18 | End: 2024-10-18

## 2024-10-18 RX ORDER — MORPHINE SULFATE 2 MG/ML
2 INJECTION, SOLUTION INTRAMUSCULAR; INTRAVENOUS
Status: DISCONTINUED | OUTPATIENT
Start: 2024-10-18 | End: 2024-10-19 | Stop reason: HOSPADM

## 2024-10-18 RX ORDER — PROPOFOL 10 MG/ML
INJECTION, EMULSION INTRAVENOUS AS NEEDED
Status: DISCONTINUED | OUTPATIENT
Start: 2024-10-18 | End: 2024-10-18

## 2024-10-18 RX ORDER — ALUMINUM HYDROXIDE, MAGNESIUM HYDROXIDE, AND SIMETHICONE 1200; 120; 1200 MG/30ML; MG/30ML; MG/30ML
30 SUSPENSION ORAL EVERY 6 HOURS PRN
Status: DISCONTINUED | OUTPATIENT
Start: 2024-10-18 | End: 2024-10-19 | Stop reason: HOSPADM

## 2024-10-18 RX ORDER — GUAIFENESIN/DEXTROMETHORPHAN 100-10MG/5
5 SYRUP ORAL EVERY 4 HOURS PRN
Status: DISCONTINUED | OUTPATIENT
Start: 2024-10-18 | End: 2024-10-19 | Stop reason: HOSPADM

## 2024-10-18 RX ORDER — ALBUTEROL SULFATE 0.83 MG/ML
2.5 SOLUTION RESPIRATORY (INHALATION) ONCE AS NEEDED
Status: DISCONTINUED | OUTPATIENT
Start: 2024-10-18 | End: 2024-10-18 | Stop reason: HOSPADM

## 2024-10-18 RX ORDER — DEXMEDETOMIDINE IN 0.9 % NACL 20 MCG/5ML
SYRINGE (ML) INTRAVENOUS AS NEEDED
Status: DISCONTINUED | OUTPATIENT
Start: 2024-10-18 | End: 2024-10-18

## 2024-10-18 RX ORDER — ENOXAPARIN SODIUM 100 MG/ML
40 INJECTION SUBCUTANEOUS EVERY 24 HOURS
Status: DISCONTINUED | OUTPATIENT
Start: 2024-10-18 | End: 2024-10-19 | Stop reason: HOSPADM

## 2024-10-18 RX ORDER — FENTANYL CITRATE 50 UG/ML
INJECTION, SOLUTION INTRAMUSCULAR; INTRAVENOUS AS NEEDED
Status: DISCONTINUED | OUTPATIENT
Start: 2024-10-18 | End: 2024-10-18

## 2024-10-18 RX ORDER — GLYCOPYRROLATE 0.2 MG/ML
INJECTION INTRAMUSCULAR; INTRAVENOUS AS NEEDED
Status: DISCONTINUED | OUTPATIENT
Start: 2024-10-18 | End: 2024-10-18

## 2024-10-18 RX ORDER — OXYCODONE HYDROCHLORIDE 5 MG/1
10 TABLET ORAL EVERY 4 HOURS PRN
Status: DISCONTINUED | OUTPATIENT
Start: 2024-10-18 | End: 2024-10-18 | Stop reason: HOSPADM

## 2024-10-18 RX ORDER — CALCIUM CARBONATE 200(500)MG
500 TABLET,CHEWABLE ORAL 4 TIMES DAILY PRN
Status: DISCONTINUED | OUTPATIENT
Start: 2024-10-18 | End: 2024-10-19 | Stop reason: HOSPADM

## 2024-10-18 RX ORDER — ONDANSETRON HYDROCHLORIDE 2 MG/ML
4 INJECTION, SOLUTION INTRAVENOUS EVERY 8 HOURS PRN
Status: DISCONTINUED | OUTPATIENT
Start: 2024-10-18 | End: 2024-10-19 | Stop reason: HOSPADM

## 2024-10-18 RX ORDER — OXYCODONE HYDROCHLORIDE 5 MG/1
5 TABLET ORAL EVERY 4 HOURS PRN
Status: DISCONTINUED | OUTPATIENT
Start: 2024-10-18 | End: 2024-10-18 | Stop reason: HOSPADM

## 2024-10-18 RX ORDER — INDOCYANINE GREEN AND WATER 25 MG
2.5 KIT INJECTION ONCE
Status: COMPLETED | OUTPATIENT
Start: 2024-10-18 | End: 2024-10-18

## 2024-10-18 RX ORDER — SODIUM CHLORIDE, SODIUM LACTATE, POTASSIUM CHLORIDE, CALCIUM CHLORIDE 600; 310; 30; 20 MG/100ML; MG/100ML; MG/100ML; MG/100ML
100 INJECTION, SOLUTION INTRAVENOUS CONTINUOUS
Status: ACTIVE | OUTPATIENT
Start: 2024-10-18 | End: 2024-10-19

## 2024-10-18 RX ORDER — CEFAZOLIN SODIUM 2 G/50ML
2 SOLUTION INTRAVENOUS ONCE
Status: DISCONTINUED | OUTPATIENT
Start: 2024-10-18 | End: 2024-10-18

## 2024-10-18 RX ORDER — LIDOCAINE HYDROCHLORIDE 20 MG/ML
INJECTION, SOLUTION INFILTRATION; PERINEURAL AS NEEDED
Status: DISCONTINUED | OUTPATIENT
Start: 2024-10-18 | End: 2024-10-18

## 2024-10-18 RX ORDER — GUAIFENESIN 600 MG/1
600 TABLET, EXTENDED RELEASE ORAL EVERY 12 HOURS PRN
Status: DISCONTINUED | OUTPATIENT
Start: 2024-10-18 | End: 2024-10-19 | Stop reason: HOSPADM

## 2024-10-18 RX ADMIN — Medication 1 TABLET: at 09:08

## 2024-10-18 RX ADMIN — NITROFURANTOIN MONOHYDRATE/MACROCRYSTALS 100 MG: 25; 75 CAPSULE ORAL at 09:08

## 2024-10-18 RX ADMIN — HYDROMORPHONE HYDROCHLORIDE 0.5 MG: 1 INJECTION, SOLUTION INTRAMUSCULAR; INTRAVENOUS; SUBCUTANEOUS at 16:52

## 2024-10-18 RX ADMIN — NITROFURANTOIN MONOHYDRATE/MACROCRYSTALS 100 MG: 25; 75 CAPSULE ORAL at 20:08

## 2024-10-18 RX ADMIN — DEXTROSE AND SODIUM CHLORIDE 75 ML/HR: 5; 900 INJECTION, SOLUTION INTRAVENOUS at 01:59

## 2024-10-18 RX ADMIN — ONDANSETRON 4 MG: 2 INJECTION, SOLUTION INTRAMUSCULAR; INTRAVENOUS at 17:01

## 2024-10-18 RX ADMIN — Medication 2.5 MG: at 13:24

## 2024-10-18 RX ADMIN — Medication 6 L/MIN: at 15:10

## 2024-10-18 RX ADMIN — ENOXAPARIN SODIUM 40 MG: 40 INJECTION SUBCUTANEOUS at 09:12

## 2024-10-18 SDOH — HEALTH STABILITY: MENTAL HEALTH: HOW OFTEN DO YOU HAVE SIX OR MORE DRINKS ON ONE OCCASION?: NEVER

## 2024-10-18 SDOH — SOCIAL STABILITY: SOCIAL INSECURITY: ABUSE: ADULT

## 2024-10-18 SDOH — SOCIAL STABILITY: SOCIAL INSECURITY: HAVE YOU HAD THOUGHTS OF HARMING ANYONE ELSE?: NO

## 2024-10-18 SDOH — SOCIAL STABILITY: SOCIAL INSECURITY
WITHIN THE LAST YEAR, HAVE YOU BEEN KICKED, HIT, SLAPPED, OR OTHERWISE PHYSICALLY HURT BY YOUR PARTNER OR EX-PARTNER?: NO

## 2024-10-18 SDOH — SOCIAL STABILITY: SOCIAL INSECURITY: ARE YOU OR HAVE YOU BEEN THREATENED OR ABUSED PHYSICALLY, EMOTIONALLY, OR SEXUALLY BY ANYONE?: NO

## 2024-10-18 SDOH — SOCIAL STABILITY: SOCIAL INSECURITY
WITHIN THE LAST YEAR, HAVE YOU BEEN RAPED OR FORCED TO HAVE ANY KIND OF SEXUAL ACTIVITY BY YOUR PARTNER OR EX-PARTNER?: NO

## 2024-10-18 SDOH — SOCIAL STABILITY: SOCIAL INSECURITY: DO YOU FEEL UNSAFE GOING BACK TO THE PLACE WHERE YOU ARE LIVING?: NO

## 2024-10-18 SDOH — ECONOMIC STABILITY: FOOD INSECURITY: WITHIN THE PAST 12 MONTHS, YOU WORRIED THAT YOUR FOOD WOULD RUN OUT BEFORE YOU GOT THE MONEY TO BUY MORE.: NEVER TRUE

## 2024-10-18 SDOH — SOCIAL STABILITY: SOCIAL INSECURITY: WERE YOU ABLE TO COMPLETE ALL THE BEHAVIORAL HEALTH SCREENINGS?: YES

## 2024-10-18 SDOH — SOCIAL STABILITY: SOCIAL INSECURITY: HAS ANYONE EVER THREATENED TO HURT YOUR FAMILY OR YOUR PETS?: NO

## 2024-10-18 SDOH — ECONOMIC STABILITY: TRANSPORTATION INSECURITY: IN THE PAST 12 MONTHS, HAS LACK OF TRANSPORTATION KEPT YOU FROM MEDICAL APPOINTMENTS OR FROM GETTING MEDICATIONS?: NO

## 2024-10-18 SDOH — SOCIAL STABILITY: SOCIAL INSECURITY: WITHIN THE LAST YEAR, HAVE YOU BEEN AFRAID OF YOUR PARTNER OR EX-PARTNER?: NO

## 2024-10-18 SDOH — HEALTH STABILITY: MENTAL HEALTH: HOW OFTEN DO YOU HAVE A DRINK CONTAINING ALCOHOL?: NEVER

## 2024-10-18 SDOH — SOCIAL STABILITY: SOCIAL INSECURITY: WITHIN THE LAST YEAR, HAVE YOU BEEN HUMILIATED OR EMOTIONALLY ABUSED IN OTHER WAYS BY YOUR PARTNER OR EX-PARTNER?: NO

## 2024-10-18 SDOH — ECONOMIC STABILITY: FOOD INSECURITY: HOW HARD IS IT FOR YOU TO PAY FOR THE VERY BASICS LIKE FOOD, HOUSING, MEDICAL CARE, AND HEATING?: NOT HARD AT ALL

## 2024-10-18 SDOH — SOCIAL STABILITY: SOCIAL INSECURITY: DOES ANYONE TRY TO KEEP YOU FROM HAVING/CONTACTING OTHER FRIENDS OR DOING THINGS OUTSIDE YOUR HOME?: NO

## 2024-10-18 SDOH — HEALTH STABILITY: MENTAL HEALTH: HOW MANY DRINKS CONTAINING ALCOHOL DO YOU HAVE ON A TYPICAL DAY WHEN YOU ARE DRINKING?: PATIENT DOES NOT DRINK

## 2024-10-18 SDOH — ECONOMIC STABILITY: FOOD INSECURITY: WITHIN THE PAST 12 MONTHS, THE FOOD YOU BOUGHT JUST DIDN'T LAST AND YOU DIDN'T HAVE MONEY TO GET MORE.: NEVER TRUE

## 2024-10-18 SDOH — SOCIAL STABILITY: SOCIAL INSECURITY: ARE THERE ANY APPARENT SIGNS OF INJURIES/BEHAVIORS THAT COULD BE RELATED TO ABUSE/NEGLECT?: NO

## 2024-10-18 SDOH — SOCIAL STABILITY: SOCIAL INSECURITY: DO YOU FEEL ANYONE HAS EXPLOITED OR TAKEN ADVANTAGE OF YOU FINANCIALLY OR OF YOUR PERSONAL PROPERTY?: NO

## 2024-10-18 SDOH — SOCIAL STABILITY: SOCIAL INSECURITY: HAVE YOU HAD ANY THOUGHTS OF HARMING ANYONE ELSE?: NO

## 2024-10-18 ASSESSMENT — LIFESTYLE VARIABLES
SKIP TO QUESTIONS 9-10: 1
AUDIT-C TOTAL SCORE: 0
HOW OFTEN DO YOU HAVE 6 OR MORE DRINKS ON ONE OCCASION: NEVER
HOW MANY STANDARD DRINKS CONTAINING ALCOHOL DO YOU HAVE ON A TYPICAL DAY: PATIENT DOES NOT DRINK
AUDIT-C TOTAL SCORE: 0
SKIP TO QUESTIONS 9-10: 1
AUDIT-C TOTAL SCORE: 0
HOW OFTEN DO YOU HAVE A DRINK CONTAINING ALCOHOL: NEVER

## 2024-10-18 ASSESSMENT — ENCOUNTER SYMPTOMS
ACTIVITY CHANGE: 0
CONSTITUTIONAL NEGATIVE: 1
PSYCHIATRIC NEGATIVE: 1
VOMITING: 0
PALPITATIONS: 0
NAUSEA: 0
ENDOCRINE NEGATIVE: 1
WHEEZING: 0
ALLERGIC/IMMUNOLOGIC NEGATIVE: 1
DIARRHEA: 0
DYSPHORIC MOOD: 0
HEMATOLOGIC/LYMPHATIC NEGATIVE: 1
CHEST TIGHTNESS: 0
APPETITE CHANGE: 1
EYES NEGATIVE: 1
SHORTNESS OF BREATH: 0
NEUROLOGICAL NEGATIVE: 1
MUSCULOSKELETAL NEGATIVE: 1
DIFFICULTY URINATING: 0
COUGH: 0
CONSTIPATION: 0
ROS GI COMMENTS: SEE HPI
ABDOMINAL PAIN: 1
RESPIRATORY NEGATIVE: 1

## 2024-10-18 ASSESSMENT — ACTIVITIES OF DAILY LIVING (ADL)
FEEDING YOURSELF: INDEPENDENT
JUDGMENT_ADEQUATE_SAFELY_COMPLETE_DAILY_ACTIVITIES: YES
PATIENT'S MEMORY ADEQUATE TO SAFELY COMPLETE DAILY ACTIVITIES?: YES
DRESSING YOURSELF: INDEPENDENT
HEARING - RIGHT EAR: FUNCTIONAL
LACK_OF_TRANSPORTATION: NO
TOILETING: INDEPENDENT
GROOMING: INDEPENDENT
BATHING: INDEPENDENT
WALKS IN HOME: INDEPENDENT
LACK_OF_TRANSPORTATION: NO
ADEQUATE_TO_COMPLETE_ADL: YES
HEARING - LEFT EAR: FUNCTIONAL

## 2024-10-18 ASSESSMENT — COGNITIVE AND FUNCTIONAL STATUS - GENERAL
MOBILITY SCORE: 24
PATIENT BASELINE BEDBOUND: NO
MOBILITY SCORE: 24
DAILY ACTIVITIY SCORE: 24
DAILY ACTIVITIY SCORE: 24

## 2024-10-18 ASSESSMENT — PAIN - FUNCTIONAL ASSESSMENT
PAIN_FUNCTIONAL_ASSESSMENT: 0-10
PAIN_FUNCTIONAL_ASSESSMENT: UNABLE TO SELF-REPORT
PAIN_FUNCTIONAL_ASSESSMENT: 0-10

## 2024-10-18 ASSESSMENT — PATIENT HEALTH QUESTIONNAIRE - PHQ9
1. LITTLE INTEREST OR PLEASURE IN DOING THINGS: NOT AT ALL
2. FEELING DOWN, DEPRESSED OR HOPELESS: NOT AT ALL
SUM OF ALL RESPONSES TO PHQ9 QUESTIONS 1 & 2: 0

## 2024-10-18 ASSESSMENT — PAIN SCALES - GENERAL
PAINLEVEL_OUTOF10: 0 - NO PAIN
PAINLEVEL_OUTOF10: 8
PAINLEVEL_OUTOF10: 0 - NO PAIN
PAINLEVEL_OUTOF10: 0 - NO PAIN
PAINLEVEL_OUTOF10: 5 - MODERATE PAIN

## 2024-10-18 ASSESSMENT — PAIN DESCRIPTION - DESCRIPTORS: DESCRIPTORS: RADIATING

## 2024-10-18 ASSESSMENT — COLUMBIA-SUICIDE SEVERITY RATING SCALE - C-SSRS
1. IN THE PAST MONTH, HAVE YOU WISHED YOU WERE DEAD OR WISHED YOU COULD GO TO SLEEP AND NOT WAKE UP?: NO
6. HAVE YOU EVER DONE ANYTHING, STARTED TO DO ANYTHING, OR PREPARED TO DO ANYTHING TO END YOUR LIFE?: NO
2. HAVE YOU ACTUALLY HAD ANY THOUGHTS OF KILLING YOURSELF?: NO

## 2024-10-18 NOTE — DISCHARGE INSTRUCTIONS
Please follow up with Dr. Villavicencio in the out patient clinic - an appointment was made for you at the [place] location 11/6/24 at 2:30 pm  [address]  [place], [place] [address] [phone]     PATIENT INSTRUCTIONS AFTER GALL BLADDER SURGERY (CHOLECYSTECTOMY)    FOLLOW-UP: Please make an appointment with Dr. Villavicencio in 2 week. Call Dr. Villavicencio's office ([phone]) or come to Emergency Department (ED)  immediately if you have any fevers greater than 102.5 degrees Fahrenheit, drainage from your wound that is not clear or looks infected, persistent bleeding, increasing abdominal pain, problems urinating, or persistent nausea/vomiting or any concerns. You should be aware that you may have right shoulder pain after surgery and that this will progressively go away. This is called 'referred pain' and is from the area of the gallbladder. It can also be caused by gas that may be trapped under the diaphragm from the surgery, especially if it was performed laparoscopically through mini-incisions. This gas will progressively get reabsorbed by your body.     WOUND CARE INSTRUCTIONS: Keep a dry, clean dressing on the wound if there is drainage. The initial bandage may be removed after 24 hours. Once the wound has quit draining, you may leave it open to air. If clothing rubs against the wound or causes irritation and the wound is not draining, you may cover it with a dry dressing during the daytime. Try to keep the wound dry and avoid ointments on the wound unless directed to do so. If the wound becomes bright red and painful or starts to drain infected material that is not clear, please contact Dr. Villavicencio's office immediately.  You should also call if you begin to drain fluid that is thin and greenish-brown from the wound and appears to look like bile. If the wound is mildly pink and has a thick firm ridge underneath it, this is normal and is referred to as a healing ridge. This will resolve over the next 4-6  weeks.    If surgery is done in open fashion, staples/sutures will be removed at your follow up appointment if staples/sutures are used to close the skin.  If surgery is done laparoscopically, the incisions are usually covered with skin glue. The sutures are under the skin. No suture removal is needed.     Some bruise around the incision is normal. You should call Dr. Villavicencio's office or come to ED with no delay if the bruise is expanding and painful.     DIET: You may eat any foods that you can tolerate. It is a good idea to eat a high fiber diet, and take in plenty of fluids to prevent constipation. If you do become constipated, you may want to take a mild laxative or take Dulcolax tablets on a daily basis until your bowel habits are regular. After recent abdominal surgery, constipation can be very uncomfortable and straining.    ACTIVITY: You are encouraged to cough and take deep breaths, or if you were given one, use your incentive spirometer every 15-30 minutes when awake. This will help prevent respiratory complications and low grade fevers post-operatively. You may want to hug a pillow when coughing and sneezing to add additional support to the surgical area which will decrease pain during these times. You are encouraged to walk and engage in light activity for the next two weeks. You should not lift more than 20 pounds during this time frame as it could put you at increased risk for a post-operative hernia. Twenty pounds is roughly equivalent to a plastic bag of groceries.     If surgery is done in open fashion, you should not lift more than 20 pounds or do sit ups for 6-8 weeks. You should also wear an abdominal binder for 6-8 weeks. You do not need to wear binder when you sleep.      MEDICATIONS: Try to take narcotic medications and anti-inflammatory medications, such as Tylenol, ibuprofen, Naprosyn, etc., with food. This will minimize stomach upset from the medication. Should you develop nausea and  vomiting from the pain medication, or develop a rash, please discontinue the medication and contact your physician. You should not drive, make important decisions, or operate machinery when taking narcotic pain medication.    IF YOU TAKE BLOOD THINNERS SUCH AS PLAVIX, XARELTO, ELIQUIS, COUMADIN OR OTHERS, YOU CAN RESTART THE MEDICATION ON THIS DATE:  NA    You can use tylenol and ibuprofen for pain.  Tylenol 650 mg every 6 hours as needed Rachel 1000 mg every 8 hours as needed  Ibuprofen 600 mg every 6 hours as needed, take with food.   You may breast food with these medicines.     QUESTIONS: Please feel free to call Dr. Villavicencio's office if you have any questions. [phone].       Please follow up with GYN for your post partum visit and to discuss your desire to have tubal ligation for a more permanent contraception method.   No sex, baths, tampons, nothing in vagina for 6 weeks after delivery of baby.   Dr. Caitlin Smith  Address: [id number] [place] [address]   [address]   [place] [address]  Phone: [phone]

## 2024-10-18 NOTE — CARE PLAN
Problem: Pain - Adult  Goal: Verbalizes/displays adequate comfort level or baseline comfort level  Outcome: Progressing       The clinical goals for the shift include Pt will have pain under control during shift

## 2024-10-18 NOTE — H&P (VIEW-ONLY)
Reason For Consult  Cholecystitis     History Of Present Illness  Janett Ray is a 44 y.o. female presenting with abd pain.     Pt states she started with epigastric abd pain 2 d ago. Pain was sharp and radiating to her back and shoulder blades. She then had nausea and vomiting.  Her sx lasted through yesterday. She had decreased appetite but did eat something yesterday though it caused worse abd pain. She came to [facility] yesterday for evaluation for her continued symptoms.   She was evaluated and US gallbladder revealed  cholelithiasis, dilatation of common bile duct. MRCP was then completed which revealed Potential periportal edema, Cholecystic edema or minimal fluid without other definite evidence  of cholecystitis, Extrahepatic bile ducts are normal, Cholelithiasis.    She was then transferred to [facility] for further management.     Since receiving IV pain medicine she denies pain. No n/v now.   No fever or chills.   No CP or SOB sx.     She had a baby 35 days ago and is breast feeding currently. She denies depression since delivery of baby. She states she does not want to get pregnant again and is interested in having her tubes tied with GYN but has not yet made an appointment.     Non smoker. No DM. No lung disease.     Past Medical History  She has a past medical history of Abnormal glucose complicating pregnancy ([facility]), Encounter for full-term uncomplicated delivery (09/12/2019), Encounter for other specified surgical aftercare (11/25/2019), Gestational diabetes (08/10/2019), Personal history of gestational diabetes (12/23/2019), and Personal history of other specified conditions (09/12/2019).    Surgical History  She has a past surgical history that includes Other surgical history (03/15/2019).     Social History  She reports that she has never smoked. She has never used smokeless tobacco. She reports that she does not drink alcohol and does not use drugs.    Family  History  No family history on file.     Allergies  Patient has no known allergies.    Review of Systems  Review of Systems   Constitutional:  Positive for appetite change. Negative for activity change.   Respiratory:  Negative for cough, chest tightness, shortness of breath and wheezing.    Cardiovascular:  Negative for chest pain, palpitations and leg swelling.   Gastrointestinal:  Positive for abdominal pain. Negative for constipation, diarrhea, nausea (resolved now) and vomiting (resolved now).   Genitourinary:  Negative for difficulty urinating, vaginal bleeding, vaginal discharge and vaginal pain.   Psychiatric/Behavioral:  Negative for behavioral problems, dysphoric mood, self-injury and suicidal ideas.           Physical Exam  Physical Exam  Vitals reviewed.   Constitutional:       General: She is not in acute distress.     Appearance: Normal appearance. She is normal weight. She is not ill-appearing, toxic-appearing or diaphoretic.   HENT:      Head: Normocephalic and atraumatic.      Mouth/Throat:      Mouth: Mucous membranes are moist.   Eyes:      General: No scleral icterus.        Right eye: No discharge.         Left eye: No discharge.      Conjunctiva/sclera: Conjunctivae normal.   Cardiovascular:      Rate and Rhythm: Normal rate and regular rhythm.      Pulses: Normal pulses.      Heart sounds: Normal heart sounds. No murmur heard.     No friction rub. No gallop.   Pulmonary:      Effort: Pulmonary effort is normal. No respiratory distress.      Breath sounds: Normal breath sounds. No stridor. No wheezing, rhonchi or rales.   Chest:      Chest wall: No tenderness.   Abdominal:      General: Bowel sounds are normal. There is no distension.      Palpations: Abdomen is soft. There is no mass.      Tenderness: There is no abdominal tenderness. There is no guarding or rebound.      Hernia: No hernia is present.   Musculoskeletal:      Right lower leg: No edema.      Left lower leg: No edema.   Skin:      "General: Skin is warm and dry.      Capillary Refill: Capillary refill takes less than 2 seconds.   Neurological:      Mental Status: She is alert and oriented to person, place, and time.   Psychiatric:         Mood and Affect: Mood normal.         Behavior: Behavior normal.         Thought Content: Thought content normal.         Judgment: Judgment normal.            Last Recorded Vitals  Blood pressure 116/76, pulse 78, temperature 37 °C (98.6 °F), temperature source Temporal, resp. rate 16, height 1.702 m (5' 7.01\"), weight 97.1 kg (214 lb 1.1 oz), last menstrual period 12/12/2023, SpO2 100%, currently breastfeeding.    Relevant Results    MRCP pancreas w and wo IV contrast    Result Date: 10/17/2024  Interpreted By:  Shiv Roman, STUDY: MRCP PANCREAS W AND WO IV CONTRAST;  10/17/2024 2:19 pm   INDICATION: Signs/Symptoms:concern for choledocolithiasis.     COMPARISON: CT chest abdomen and pelvis and gallbladder ultrasound   ACCESSION NUMBER(S): [id number]   ORDERING CLINICIAN: TRANG MANNING   TECHNIQUE: MRI PANCREAS; Multiplanar magnetic resonance images of the abdomen were obtained including the following sequences; T2-weighted SSFSE with and without fat saturation, T1-weighted GRE in/opposed phase, DWI, fat saturated 3D-T1w GRE pre and dynamically post contrast. Radial thick slab T2w RARE MRCP and coronally reconstructed navigator gated high resolution 3-D T2w RESTORE MRCP with MIP reconstruction were also performed for MRCP.  19 ML of Dotarem was administered intravenously without immediate complication.   FINDINGS: LIVER: Normal size and morphology. Circumferential periportal T2 hyperintensity is potentially indicating edema as noted on image 7/20. Normal enhancement. Hepatic parenchyma is isointense on T1 in and out of phase sequences.  No liver mass.   BILE DUCTS: No intrahepatic or extrahepatic bile duct dilatation is demonstrated. The common bile duct measures 5 mm caliber image 3/16 with " smooth distal tapering without evident obstructing mass or calculus.   GALLBLADDER: Nondistended containing multiple gallstones. The gallbladder wall demonstrates normal caliber. There is pericholecystic edema or minimal fluid.   PANCREAS: Normal signal intensity. Normal enhancement. No masses. The pancreatic duct is normal.   SPLEEN: Within normal limits.   ADRENAL GLANDS: Within normal limits.   KIDNEYS: Within normal limits.   LYMPH NODES: No adenopathy   ABDOMINAL VESSELS: Aorta and the major abdominal arterial vessels demonstrate no gross abnormality.  Superior mesenteric vein, splenic vein, and main, right and left portal vein are patent. Hepatic veins are patent.  No significant collaterals or esophageal varices are present.   BOWEL: The stomach and visualized bowel are nondilated without wall thickening.   PERITONEUM/RETROPERITONEUM: No significant abnormality.   BONES AND LOWER THORAX: No abnormally enhancing focal bony lesions. The visualized lower lung fields are unremarkable. The heart is normal in size.       1.  Potential periportal edema which could be related to normal variation, systemic hypervolemia, passive hepatic congestion, hepatic trauma, hepatitis or cholangitis in the appropriate clinical setting. Recommend correlation with symptomatology and liver function tests. 2. Cholecystic edema or minimal fluid without other definite evidence of cholecystitis favoring secondary edema although primary cholecystitis not excluded in the appropriate clinical setting. 3. Extrahepatic bile ducts are normal caliber. 4. Cholelithiasis.     MACRO: None   Signed by: Shiv Roman 10/17/2024 2:52 PM Dictation workstation:   [id number]    ECG 12 lead    Result Date: 10/17/2024  Normal sinus rhythm Normal ECG No previous ECGs available    CT angio chest for pulmonary embolism    Result Date: 10/17/2024  STUDY: CT Angiogram of the Chest, CT Abdomen and Pelvis with IV Contrast; 10/17/2024 7:46 AM INDICATION:  Chest pain.  Right upper quadrant pain. COMPARISON: CXR 10/17/2024. ACCESSION NUMBER(S): [id number], [id number] ORDERING CLINICIAN: TRANG MANNING TECHNIQUE:  CTA of the chest was performed following rapid injection of intravenous contrast.  Images are reviewed and processed at a workstation according to the CT angiogram protocol with 3-D and/or MIP post processing imaging generated.  CT of the abdomen and pelvis was performed with intravenous contrast.  Omnipaque 350 75 mL was administered intravenously; positive oral contrast was given. Automated mA/kV exposure control was utilized and patient examination was performed in strict accordance with principles of ALARA. FINDINGS:  CTA CHEST: Pulmonary arteries are adequately opacified without acute or chronic filling defects.  The thoracic aorta is normal in course and caliber without dissection or aneurysm. The heart is normal in size without pericardial effusion.  Thoracic lymph nodes are not enlarged. There is no pleural effusion, pleural thickening, or pneumothorax. The airways are patent. Tiny micronodules in the lung bases measuring up to 3 to 4 mm in the left lower lobe. ABDOMEN:  LIVER: No hepatomegaly.  Smooth surface contour.  Mild fatty infiltration of the liver  BILE DUCTS: Common bile duct is mildly prominent measuring up to 1 cm  GALLBLADDER: The gallbladder is present with underlying gallstones and minimal gallbladder wall thickening..  STOMACH: No abnormalities identified.  PANCREAS: No masses or ductal dilatation.  SPLEEN: No splenomegaly or focal splenic lesion.  ADRENAL GLANDS: No thickening or nodules.  KIDNEYS AND URETERS: Kidneys are normal in size and location.  No renal or ureteral calculi.  PELVIS:  BLADDER: No abnormalities identified.  REPRODUCTIVE ORGANS: No abnormalities identified.  BOWEL: No abnormalities identified.  VESSELS: No abnormalities identified.  Abdominal aorta is normal in caliber.  PERITONEUM/RETROPERITONEUM/LYMPH  NODES: No free fluid.  No pneumoperitoneum. No lymphadenopathy.  ABDOMINAL WALL: No abnormalities identified. SOFT TISSUES: No abnormalities identified.  BONES: No acute fracture or aggressive osseous lesion.    1. No pulmonary embolism or acute aortic pathology. 2. Cholelithiasis with minimal gallbladder wall thickening.  Findings may be secondary to cholecystitis.  Consider further evaluation with a HIDA scan. 3. Mildly prominent common bile duct measuring up to 1 cm. Consider further evaluation with MRCP. 4. Mild hepatic steatosis. 5. Tiny micronodules in the lung bases measuring up to 3 to 4 mm in the left lower lobe. Pulmonary nodule recommendation: No follow-up needed if patient is low-risk (and has no known or suspected primary neoplasm). Non-contrast chest CT can be considered in 12 months if patient is high-risk (Per Fleischner Society guidelines). Signed by Keven Jones MD    CT abdomen pelvis w IV contrast    Result Date: 10/17/2024  STUDY: CT Angiogram of the Chest, CT Abdomen and Pelvis with IV Contrast; 10/17/2024 7:46 AM INDICATION: Chest pain.  Right upper quadrant pain. COMPARISON: CXR 10/17/2024. ACCESSION NUMBER(S): [id number], [id number] ORDERING CLINICIAN: TRANG MANNING TECHNIQUE:  CTA of the chest was performed following rapid injection of intravenous contrast.  Images are reviewed and processed at a workstation according to the CT angiogram protocol with 3-D and/or MIP post processing imaging generated.  CT of the abdomen and pelvis was performed with intravenous contrast.  Omnipaque 350 75 mL was administered intravenously; positive oral contrast was given. Automated mA/kV exposure control was utilized and patient examination was performed in strict accordance with principles of ALARA. FINDINGS:  CTA CHEST: Pulmonary arteries are adequately opacified without acute or chronic filling defects.  The thoracic aorta is normal in course and caliber without dissection or aneurysm. The  heart is normal in size without pericardial effusion.  Thoracic lymph nodes are not enlarged. There is no pleural effusion, pleural thickening, or pneumothorax. The airways are patent. Tiny micronodules in the lung bases measuring up to 3 to 4 mm in the left lower lobe. ABDOMEN:  LIVER: No hepatomegaly.  Smooth surface contour.  Mild fatty infiltration of the liver  BILE DUCTS: Common bile duct is mildly prominent measuring up to 1 cm  GALLBLADDER: The gallbladder is present with underlying gallstones and minimal gallbladder wall thickening..  STOMACH: No abnormalities identified.  PANCREAS: No masses or ductal dilatation.  SPLEEN: No splenomegaly or focal splenic lesion.  ADRENAL GLANDS: No thickening or nodules.  KIDNEYS AND URETERS: Kidneys are normal in size and location.  No renal or ureteral calculi.  PELVIS:  BLADDER: No abnormalities identified.  REPRODUCTIVE ORGANS: No abnormalities identified.  BOWEL: No abnormalities identified.  VESSELS: No abnormalities identified.  Abdominal aorta is normal in caliber.  PERITONEUM/RETROPERITONEUM/LYMPH NODES: No free fluid.  No pneumoperitoneum. No lymphadenopathy.  ABDOMINAL WALL: No abnormalities identified. SOFT TISSUES: No abnormalities identified.  BONES: No acute fracture or aggressive osseous lesion.    1. No pulmonary embolism or acute aortic pathology. 2. Cholelithiasis with minimal gallbladder wall thickening.  Findings may be secondary to cholecystitis.  Consider further evaluation with a HIDA scan. 3. Mildly prominent common bile duct measuring up to 1 cm. Consider further evaluation with MRCP. 4. Mild hepatic steatosis. 5. Tiny micronodules in the lung bases measuring up to 3 to 4 mm in the left lower lobe. Pulmonary nodule recommendation: No follow-up needed if patient is low-risk (and has no known or suspected primary neoplasm). Non-contrast chest CT can be considered in 12 months if patient is high-risk (Per Fleischner Society guidelines). Signed by  Keven Jones MD    US gallbladder    Result Date: 10/17/2024  STUDY: Right Upper Quadrant Ultrasound; 10/17/2024 7:56 AM INDICATION: Elevated LFT.  Epigastric pain radiating to the back. COMPARISON: None Available. ACCESSION NUMBER(S): [id number] ORDERING CLINICIAN: TRANG MANNING TECHNIQUE: Ultrasound of the Right Upper Quadrant. FINDINGS: LIVER: The liver demonstrates normal echogenicity.   GALLBLADDER: The gallbladder contains multiple stones.  There is no pericholecystic fluid or wall thickening.  Sonographic Mckeon's sign is negative.    BILE DUCTS: The common bile duct measures 0.9 cm.  There is no intrahepatic biliary dilatation.   PANCREAS: The pancreas demonstrates a normal homogeneous echotexture with the tail not well seen due to overlying bowel gas.  RIGHT KIDNEY: The right kidney measures 10.7 cm in length.  Renal cortical echotexture is normal.  There is no hydronephrosis.  There are no stones.  There are no cysts.    Cholelithiasis.  No gallbladder wall thickening is appreciated. Dilatation of the common bile duct measuring 0.9 cm. Could consider further evaluation with MRCP. Signed by Keven Jones MD    XR chest 1 view    Result Date: 10/17/2024  STUDY: Chest Radiograph;  10/17/2024 5:57 AM INDICATION: Chest pain. COMPARISON: None Available ACCESSION NUMBER(S): [id number] ORDERING CLINICIAN: ARIE GAMBOA TECHNIQUE:  Frontal chest was obtained at 5:57 hours. FINDINGS: CARDIOMEDIASTINAL SILHOUETTE: Heart size normal. Airway normal. Mediastinum normal no widening. Hilar areas normal. No paraspinous density  LUNGS: Lungs are clear. No infiltrate. No pneumothorax. No effusion. No focal pulmonary parenchymal nodular opacity identified.  ABDOMEN: No remarkable upper abdominal findings. No free air.  BONES: No acute osseous changes. No evidence of rib fracture or periosteal reaction. Minimal thoracic spine degenerative changes.    Normal single view chest examination. No evidence of infiltrate  or focal abnormality. No pneumothorax. No rib fracture identified. Signed by Vinh Kilgore MD    Scheduled medications  ceFAZolin, 2 g, intravenous, Once  enoxaparin, 40 mg, subcutaneous, q24h  indocyanine green, 2.5 mg, intravenous, Once  prenatal vitamin (iron-folic), 1 tablet, oral, Daily      Continuous medications  D5 % and 0.9 % sodium chloride, 75 mL/hr, Last Rate: 75 mL/hr (10/18/24 0633)      PRN medications  PRN medications: acetaminophen **OR** acetaminophen **OR** acetaminophen, alum-mag hydroxide-simeth, calcium carbonate, dextromethorphan-guaifenesin, guaiFENesin, morphine, ondansetron **OR** ondansetron  Results for orders placed or performed during the hospital encounter of 10/18/24 (from the past 24 hours)   CBC   Result Value Ref Range    WBC 5.8 4.4 - 11.3 x10*3/uL    nRBC 0.0 0.0 - 0.0 /100 WBCs    RBC 4.84 4.00 - 5.20 x10*6/uL    Hemoglobin 13.7 12.0 - 16.0 g/dL    Hematocrit 42.6 36.0 - 46.0 %    MCV 88 80 - 100 fL    MCH 28.3 26.0 - 34.0 pg    MCHC 32.2 32.0 - 36.0 g/dL    RDW 13.8 11.5 - 14.5 %    Platelets 283 150 - 450 x10*3/uL        Assessment/Plan     This is a 44 year old female with cholelithiasis and biliary colic. Dr. Villavicencio will plan to take the patient to OR today for laparoscopic cholecystectomy.  - imaging reviewed.  - labs reviewed. Elevated LFTs. These were elevated at time of delivery of recent baby, may have been still related to cholelithiasis. Recommend repeat cmp in 1 month to ensure these are improving. This can be done at any out patient lab, order placed.   - will get indocyanine green 2.5 mg IV intraoperatively.  - ance 2 g just prior to surgery.  - NPO.  - IVF D 5 NS at 75 ml / hour for hydration while NPO.  - pain medicine per primary.  - zofran 4 mg IV PRN nausea.  - activity as tolerated.  - pt will need to follow up with Dr. Villavicencio in 2 weeks post operatively. No lifting over 10 lb, infant is 11 lb now. Recommend pump and dump breast milk for 24 hours after  surgery. Can use OTC tylenol and ibuprofen for pain postoperatively and may breast feed with these medicines.    - pt may need to stay overnight again tonight depending on how she is doing after anesthesia.     Contraception management  - recommend follow up with GYN to discuss her desire to have tubal ligation, she will be needing post-partum appointment as well and these can be completed at the same time. Phone number on discharge papers.     Pt seen with Dr. Villavicencio.     Yaima Smalls, RAY-CNP

## 2024-10-18 NOTE — ANESTHESIA PROCEDURE NOTES
Airway  Date/Time: 10/18/2024 1:52 PM  Urgency: elective    Airway not difficult    Staffing  Performed: CRNA   Authorized by: RAY Crespo-CALE    Performed by: NURA Gilbert  Patient location during procedure: OR    Indications and Patient Condition  Indications for airway management: anesthesia  Spontaneous ventilation: present  Sedation level: deep  Preoxygenated: yes  Patient position: sniffing  Mask difficulty assessment: 1 - vent by mask  Planned trial extubation    Final Airway Details  Final airway type: endotracheal airway      Successful airway: ETT  Cuffed: yes   Successful intubation technique: video laryngoscopy  Facilitating devices/methods: intubating stylet  Endotracheal tube insertion site: oral  Blade: Jenny  Blade size: #3  ETT size (mm): 7.0  Cormack-Lehane Classification: grade I - full view of glottis  Placement verified by: capnometry   Measured from: lips  ETT to lips (cm): 20  Number of attempts at approach: 1    Additional Comments  Gentry 3 videolaryngoscope used,  LTA kit applied, lubricated tube, atraumatic intubation.

## 2024-10-18 NOTE — CARE PLAN
The patient's goals for the shift include pain control and sleep    The clinical goals for the shift include pain control

## 2024-10-18 NOTE — OP NOTE
Cholecystectomy Laparoscopy Operative Note     Date: 10/18/2024  OR Location: [facility] OR    Name: Janett Ray, : 1980, Age: 44 y.o., MRN: [id number], Sex: female    Diagnosis  * No Diagnosis Codes entered *cholecystitis * No Diagnosis Codes entered *cholecystitis     Procedures  Cholecystectomy Laparoscopy  49775 - AK LAPAROSCOPY SURG CHOLECYSTECTOMY  Tap block    Surgeons      * Aurora Villavicencio - Primary    Resident/Fellow/Other Assistant:  Surgeons and Role:  * No surgeons found with a matching role *    Procedure Summary  Anesthesia: Anesthesia type not filed in the log.  ASA: II  Anesthesia Staff: CRNA: Leigha Welsh, APRN-CRNA; Ct Paula APRN-CRNA  Estimated Blood Loss: 1mL  Intra-op Medications:   Administrations occurring from 1300 to 1510 on 10/18/24:   Medication Name Total Dose   BUPivacaine HCl (Marcaine) 0.5 % (5 mg/mL) injection 30 mL   ceFAZolin (Ancef) vial 1 g 2 g   dexAMETHasone 4 mg/mL 8 mg   dexMEDETOMidine 4 mcg/mL in NS syringe 8 mcg   fentaNYL PF 0.05 mg/mL 100 mcg   glycopyrrolate (Robinul) injection 0.2 mg/mL 0.2 mg   ketamine injection 50 mg/mL 25 mg   ketorolac (Toradol) 30 mg 30 mg   LR bolus Cannot be calculated   lidocaine (Xylocaine) injection 2 % 50 mg   midazolam (Versed) 1 mg/1 mL 2 mg   propofol (Diprivan) infusion 10 mg/mL 485.87 mg   rocuronium (ZeMuron) 50 mg/5 mL injection 50 mg   sugammadex (Bridion) 200 mg/2 mL injection 200 mg   indocyanine green (IC-Green) injection 2.5 mg 2.5 mg              Anesthesia Record               Intraprocedure I/O Totals          Intake    Ketamine 0.00 mL    The total shown is the total volume documented since Anesthesia Start was filed.    Propofol Drip 0.00 mL    The total shown is the total volume documented since Anesthesia Start was filed.    Total Intake 0 mL          Specimen:   ID Type Source Tests Collected by Time   1 : GALLBLADDER AND CONTENTS Tissue GALLBLADDER CHOLECYSTECTOMY SURGICAL PATHOLOGY EXAM Aurora Villavicencio MD  10/18/2024 1404        Staff:   Scrub Person: Katherine  Circulator: Karely         Drains and/or Catheters: * None in log *    Tourniquet Times:         Implants:     Findings: edematous GB    Indications: Janett Ray is an 44 y.o. female who is having surgery for * No pre-op diagnosis entered *. cholecystitis    The patient was seen in the preoperative area. The risks, benefits, complications, treatment options, non-operative alternatives, expected recovery and outcomes were discussed with the patient. The possibilities of reaction to medication, pulmonary aspiration, injury to surrounding structures, bleeding, recurrent infection, the need for additional procedures, failure to diagnose a condition, and creating a complication requiring transfusion or operation were discussed with the patient. The patient concurred with the proposed plan, giving informed consent.  The site of surgery was properly noted/marked if necessary per policy. The patient has been actively warmed in preoperative area. Preoperative antibiotics have been ordered and given within 1 hours of incision. Venous thrombosis prophylaxis have been ordered including bilateral sequential compression devices    Procedure Details: The patient was brought to the operating theater placed on the operating table in supine position after sufficient general anesthesia was administered the patient's abdomen was prepped and draped in usual sterile manner.  An incision was made at the umbilicus the abdominal wall was lifted up a Veress needle was inserted a water drop test confirmed placement into the peritoneal cavity a CO2 pneumoperitoneum was slowly achieved.  The trocar was inserted followed by the laparoscope there was no injury to bowel or blood vessel.  We then turned our attention to the upper abdomen she was positioned and 3 additional ports were placed in the upper abdomen the gallbladder was extraordinarily edematous and thick-walled.  It was  retracted upward the infundibulum was exposed we cleared the cystic duct and the cystic artery and with the use of the indocyanine green dye identified that anatomy clearly.  We clipped against the gallbladder side and then 2 clips and also a Caprosyn suture around the proximal cystic duct which was then divided the cystic artery was clipped twice proximally LigaSure used distally and then other attachments to the gallbladder were divided using the LigaSure and also the hook monopolar cautery.  The gallbladder was placed into an Endo Catch the transversus abdominis plane block was done on both sides using half percent Marcaine the gallbladder was then extracted through the umbilical incision and the fascia was closed using an 0 Vicryl in a figure-of-eight skin incisions were closed using 4-0 Vicryl subcuticular suture and Dermabond as a dressing.  Complications:  None; patient tolerated the procedure well.    Disposition: PACU - hemodynamically stable.  Condition: stable         Additional Details:     Attending Attestation:     Aurora Villavicencio  Phone Number: [phone]

## 2024-10-18 NOTE — PROGRESS NOTES
"  Subjective    Patient denies having abdominal pain today and also denies nausea, vomiting, diarrhea, chest pain or shortness of breath.    Objective    Vitals  Visit Vitals  BP (!) 147/99   Pulse 78   Temp 37.4 °C (99.3 °F)   Resp 16   Ht 1.702 m (5' 7.01\")   Wt 97.1 kg (214 lb 1.1 oz)   LMP 12/12/2023 (Exact Date)   SpO2 100%   BMI 33.52 kg/m²   OB Status Recent pregnancy   Smoking Status Never   BSA 2.14 m²       Physical Exam   General: Patient is alert.  No acute distress.  HEENT: Clear sclera.  CVS: RRR.  Lungs: CTAB.  Abdomen: Soft.  Mild right upper quadrant tenderness to palpation with no guarding.  Bowel sounds present.  Extremities: No pitting edema bilateral ankles.    Psychiatric: Cooperative.     IOs    Intake/Output Summary (Last 24 hours) at 10/18/2024 1445  Last data filed at 10/18/2024 1105  Gross per 24 hour   Intake 682.5 ml   Output --   Net 682.5 ml       Labs:   Results from last 72 hours   Lab Units 10/18/24  0623 10/17/24  0546   SODIUM mmol/L 141 140   POTASSIUM mmol/L 3.6 3.7   CHLORIDE mmol/L 104 102   CO2 mmol/L 29 29   BUN mg/dL 9 10   CREATININE mg/dL 0.94 0.82   GLUCOSE mg/dL 96 124*   CALCIUM mg/dL 8.6 9.1   ANION GAP mmol/L 12 13   EGFR mL/min/1.73m*2 78 >90      Results from last 72 hours   Lab Units 10/18/24  0623 10/17/24  0546   WBC AUTO x10*3/uL 5.8 9.9   HEMOGLOBIN g/dL 13.7 14.0   HEMATOCRIT % 42.6 43.8   PLATELETS AUTO x10*3/uL 283 342   NEUTROS PCT AUTO %  --  79.8   LYMPHS PCT AUTO %  --  14.2   MONOS PCT AUTO %  --  5.4   EOS PCT AUTO %  --  0.2      Lab Results   Component Value Date    CALCIUM 8.6 10/18/2024      No results found for: \"CRP\"   [unfilled]       Images  MRCP pancreas w and wo IV contrast  Narrative: Interpreted By:  Shiv Roman,   STUDY:  MRCP PANCREAS W AND WO IV CONTRAST;  10/17/2024 2:19 pm      INDICATION:  Signs/Symptoms:concern for choledocolithiasis.          COMPARISON:  CT chest abdomen and pelvis and gallbladder ultrasound    "   ACCESSION NUMBER(S):  [id number]      ORDERING CLINICIAN:  TRANG MANNING      TECHNIQUE:  MRI PANCREAS; Multiplanar magnetic resonance images of the abdomen  were obtained including the following sequences; T2-weighted SSFSE  with and without fat saturation, T1-weighted GRE in/opposed phase,  DWI, fat saturated 3D-T1w GRE pre and dynamically post contrast.  Radial thick slab T2w RARE MRCP and coronally reconstructed navigator  gated high resolution 3-D T2w RESTORE MRCP with MIP reconstruction  were also performed for MRCP.  19 ML of Dotarem was administered  intravenously without immediate complication.      FINDINGS:  LIVER:  Normal size and morphology. Circumferential periportal T2  hyperintensity is potentially indicating edema as noted on image  7/20. Normal enhancement. Hepatic parenchyma is isointense on T1 in  and out of phase sequences.  No liver mass.      BILE DUCTS:  No intrahepatic or extrahepatic bile duct dilatation is demonstrated.  The common bile duct measures 5 mm caliber image 3/16 with smooth  distal tapering without evident obstructing mass or calculus.      GALLBLADDER:  Nondistended containing multiple gallstones. The gallbladder wall  demonstrates normal caliber. There is pericholecystic edema or  minimal fluid.      PANCREAS:  Normal signal intensity. Normal enhancement. No masses. The  pancreatic duct is normal.      SPLEEN:  Within normal limits.      ADRENAL GLANDS:  Within normal limits.      KIDNEYS:  Within normal limits.      LYMPH NODES:  No adenopathy      ABDOMINAL VESSELS:  Aorta and the major abdominal arterial vessels demonstrate no gross  abnormality.  Superior mesenteric vein, splenic vein, and main, right  and left portal vein are patent. Hepatic veins are patent.  No  significant collaterals or esophageal varices are present.      BOWEL:  The stomach and visualized bowel are nondilated without wall  thickening.      PERITONEUM/RETROPERITONEUM:  No significant  abnormality.      BONES AND LOWER THORAX:  No abnormally enhancing focal bony lesions. The visualized lower lung  fields are unremarkable. The heart is normal in size.      Impression: 1.  Potential periportal edema which could be related to normal  variation, systemic hypervolemia, passive hepatic congestion, hepatic  trauma, hepatitis or cholangitis in the appropriate clinical setting.  Recommend correlation with symptomatology and liver function tests.  2. Cholecystic edema or minimal fluid without other definite evidence  of cholecystitis favoring secondary edema although primary  cholecystitis not excluded in the appropriate clinical setting.  3. Extrahepatic bile ducts are normal caliber.  4. Cholelithiasis.          MACRO:  None      Signed by: Shiv Roman 10/17/2024 2:52 PM  Dictation workstation:   [id number]  ECG 12 lead  Normal sinus rhythm  Normal ECG  No previous ECGs available  CT abdomen pelvis w IV contrast  Narrative: STUDY:  CT Angiogram of the Chest, CT Abdomen and Pelvis with IV Contrast;  10/17/2024 7:46 AM  INDICATION:  Chest pain.  Right upper quadrant pain.  COMPARISON:  CXR 10/17/2024.  ACCESSION NUMBER(S):  [id number], [id number]  ORDERING CLINICIAN:  TRANG MANNING  TECHNIQUE:  CTA of the chest was performed following rapid injection  of intravenous contrast.  Images are reviewed and processed at a  workstation according to the CT angiogram protocol with 3-D and/or MIP  post processing imaging generated.  CT of the abdomen and pelvis was  performed with intravenous contrast.  Omnipaque 350 75 mL was  administered intravenously; positive oral contrast was given.  Automated mA/kV exposure control was utilized and patient examination  was performed in strict accordance with principles of ALARA.  FINDINGS:    CTA CHEST:  Pulmonary arteries are adequately opacified without acute or chronic  filling defects.  The thoracic aorta is normal in course and caliber  without dissection  or aneurysm.  The heart is normal in size without pericardial effusion.  Thoracic  lymph nodes are not enlarged.  There is no pleural effusion, pleural thickening, or pneumothorax.   The airways are patent.  Tiny micronodules in the lung bases measuring up to 3 to 4 mm in the  left lower lobe.  ABDOMEN:     LIVER:  No hepatomegaly.  Smooth surface contour.  Mild fatty infiltration of  the liver     BILE DUCTS:  Common bile duct is mildly prominent measuring up to 1 cm     GALLBLADDER:  The gallbladder is present with underlying gallstones and minimal  gallbladder wall thickening..     STOMACH:  No abnormalities identified.     PANCREAS:  No masses or ductal dilatation.     SPLEEN:  No splenomegaly or focal splenic lesion.     ADRENAL GLANDS:  No thickening or nodules.     KIDNEYS AND URETERS:  Kidneys are normal in size and location.  No renal or ureteral  calculi.     PELVIS:     BLADDER:  No abnormalities identified.     REPRODUCTIVE ORGANS:  No abnormalities identified.     BOWEL:  No abnormalities identified.     VESSELS:  No abnormalities identified.  Abdominal aorta is normal in caliber.      PERITONEUM/RETROPERITONEUM/LYMPH NODES:  No free fluid.  No pneumoperitoneum.  No lymphadenopathy.     ABDOMINAL WALL:  No abnormalities identified.  SOFT TISSUES:   No abnormalities identified.     BONES:  No acute fracture or aggressive osseous lesion.  Impression: 1. No pulmonary embolism or acute aortic pathology.  2. Cholelithiasis with minimal gallbladder wall thickening.  Findings  may be secondary to cholecystitis.  Consider further evaluation with a  HIDA scan.  3. Mildly prominent common bile duct measuring up to 1 cm. Consider  further evaluation with MRCP.  4. Mild hepatic steatosis.  5. Tiny micronodules in the lung bases measuring up to 3 to 4 mm in  the left lower lobe.  Pulmonary nodule recommendation: No follow-up needed if patient is  low-risk (and has no known or suspected primary  neoplasm).  Non-contrast chest CT can be considered in 12 months if patient is  high-risk (Per Fleischner Society guidelines).  Signed by Keven Jones MD  CT angio chest for pulmonary embolism  Narrative: STUDY:  CT Angiogram of the Chest, CT Abdomen and Pelvis with IV Contrast;  10/17/2024 7:46 AM  INDICATION:  Chest pain.  Right upper quadrant pain.  COMPARISON:  CXR 10/17/2024.  ACCESSION NUMBER(S):  [id number], [id number]  ORDERING CLINICIAN:  TRANG MANNING  TECHNIQUE:  CTA of the chest was performed following rapid injection  of intravenous contrast.  Images are reviewed and processed at a  workstation according to the CT angiogram protocol with 3-D and/or MIP  post processing imaging generated.  CT of the abdomen and pelvis was  performed with intravenous contrast.  Omnipaque 350 75 mL was  administered intravenously; positive oral contrast was given.  Automated mA/kV exposure control was utilized and patient examination  was performed in strict accordance with principles of ALARA.  FINDINGS:    CTA CHEST:  Pulmonary arteries are adequately opacified without acute or chronic  filling defects.  The thoracic aorta is normal in course and caliber  without dissection or aneurysm.  The heart is normal in size without pericardial effusion.  Thoracic  lymph nodes are not enlarged.  There is no pleural effusion, pleural thickening, or pneumothorax.   The airways are patent.  Tiny micronodules in the lung bases measuring up to 3 to 4 mm in the  left lower lobe.  ABDOMEN:     LIVER:  No hepatomegaly.  Smooth surface contour.  Mild fatty infiltration of  the liver     BILE DUCTS:  Common bile duct is mildly prominent measuring up to 1 cm     GALLBLADDER:  The gallbladder is present with underlying gallstones and minimal  gallbladder wall thickening..     STOMACH:  No abnormalities identified.     PANCREAS:  No masses or ductal dilatation.     SPLEEN:  No splenomegaly or focal splenic lesion.     ADRENAL  GLANDS:  No thickening or nodules.     KIDNEYS AND URETERS:  Kidneys are normal in size and location.  No renal or ureteral  calculi.     PELVIS:     BLADDER:  No abnormalities identified.     REPRODUCTIVE ORGANS:  No abnormalities identified.     BOWEL:  No abnormalities identified.     VESSELS:  No abnormalities identified.  Abdominal aorta is normal in caliber.      PERITONEUM/RETROPERITONEUM/LYMPH NODES:  No free fluid.  No pneumoperitoneum.  No lymphadenopathy.     ABDOMINAL WALL:  No abnormalities identified.  SOFT TISSUES:   No abnormalities identified.     BONES:  No acute fracture or aggressive osseous lesion.  Impression: 1. No pulmonary embolism or acute aortic pathology.  2. Cholelithiasis with minimal gallbladder wall thickening.  Findings  may be secondary to cholecystitis.  Consider further evaluation with a  HIDA scan.  3. Mildly prominent common bile duct measuring up to 1 cm. Consider  further evaluation with MRCP.  4. Mild hepatic steatosis.  5. Tiny micronodules in the lung bases measuring up to 3 to 4 mm in  the left lower lobe.  Pulmonary nodule recommendation: No follow-up needed if patient is  low-risk (and has no known or suspected primary neoplasm).  Non-contrast chest CT can be considered in 12 months if patient is  high-risk (Per Fleischner Society guidelines).  Signed by Keven Jones MD  US gallbladder  Narrative: STUDY:  Right Upper Quadrant Ultrasound; 10/17/2024 7:56 AM  INDICATION:  Elevated LFT.  Epigastric pain radiating to the back.  COMPARISON:  None Available.  ACCESSION NUMBER(S):  [id number]  ORDERING CLINICIAN:  TRANG MANNING  TECHNIQUE:  Ultrasound of the Right Upper Quadrant.  FINDINGS:  LIVER:  The liver demonstrates normal echogenicity.       GALLBLADDER:  The gallbladder contains multiple stones.  There is no pericholecystic  fluid or wall thickening.  Sonographic Mckeon's sign is negative.        BILE DUCTS:  The common bile duct measures 0.9 cm.  There is no  intrahepatic  biliary dilatation.       PANCREAS:  The pancreas demonstrates a normal homogeneous echotexture with the  tail not well seen due to overlying bowel gas.      RIGHT KIDNEY:  The right kidney measures 10.7 cm in length.  Renal cortical  echotexture is normal.  There is no hydronephrosis.  There are no  stones.  There are no cysts.  Impression: Cholelithiasis.  No gallbladder wall thickening is appreciated.  Dilatation of the common bile duct measuring 0.9 cm. Could consider  further evaluation with MRCP.  Signed by Keven Jones MD  XR chest 1 view  Narrative: STUDY:  Chest Radiograph;  10/17/2024 5:57 AM  INDICATION:  Chest pain.  COMPARISON:  None Available  ACCESSION NUMBER(S):  [id number]  ORDERING CLINICIAN:  ARIE GAMBOA  TECHNIQUE:  Frontal chest was obtained at 5:57 hours.  FINDINGS:  CARDIOMEDIASTINAL SILHOUETTE:  Heart size normal. Airway normal. Mediastinum normal no widening.  Hilar areas normal. No paraspinous density     LUNGS:  Lungs are clear. No infiltrate. No pneumothorax. No effusion. No focal  pulmonary parenchymal nodular opacity identified.     ABDOMEN:  No remarkable upper abdominal findings. No free air.     BONES:  No acute osseous changes. No evidence of rib fracture or periosteal  reaction. Minimal thoracic spine degenerative changes.  Impression: Normal single view chest examination. No evidence of infiltrate or  focal abnormality. No pneumothorax. No rib fracture identified.  Signed by Vinh Kilgore MD      Meds  Scheduled medications  [Transfer Hold] ceFAZolin, 2 g, intravenous, Once  enoxaparin, 40 mg, subcutaneous, q24h  [Transfer Hold] nitrofurantoin (macrocrystal-monohydrate), 100 mg, oral, q12h FUENTES  prenatal vitamin (iron-folic), 1 tablet, oral, Daily      Continuous medications  D5 % and 0.9 % sodium chloride, 75 mL/hr, Last Rate: 75 mL/hr (10/18/24 1105)      PRN medications  PRN medications: acetaminophen **OR** acetaminophen **OR** acetaminophen, alum-mag  hydroxide-simeth, BUPivacaine HCl, calcium carbonate, dextromethorphan-guaifenesin, guaiFENesin, morphine, ondansetron **OR** ondansetron     Assessment and Plan    Janett Ray is a 44 y.o. female with past medical history of gestational diabetes, little over 1 month postpartum, admitted to the hospital with cholelithiasis with biliary colic.    Cholelithiasis with biliary colic  -General Surgery following and planning for lap cholecystectomy today.  -Patient placed on cefazolin.  -Monitor.    Elevated LFTs  -Possibly secondary to cholelithiasis with biliary colic.  -General Surgery following and plan for lap cholecystectomy today.  -Monitor.    Possible UTI  -Follow urine culture.  Started on nitrofurantoin.  Monitor.    DVT prophylaxis  -Lovenox subcu.

## 2024-10-18 NOTE — H&P
History Of Present Illness  Janett Ray is a 44 y.o. female who is s/p delivery of a healthy baby boy a little over a month ago presenting with abdominal pain.  She reports being in her usual state of health until last night when she developed severe lower sternal/epigastric pain radiating to her back.  She says the pain was constant and she did not sleep all night long.  She had nausea and a large emesis.  There was no hematemesis, hematochezia, melena, diarrhea, fever, chills, or shortness of breath.  The following morning, her  took her to [facility] ED. Her work up there revealed cholelithiasis and dilated common bile duct. She was transferred to [facility] for further care and management.       Past Medical History  Past Medical History:   Diagnosis Date    Abnormal glucose complicating pregnancy ([facility])     Abnormal glucose tolerance in pregnancy    Encounter for full-term uncomplicated delivery ([facility]) 2019     (spontaneous vaginal delivery)    Encounter for other specified surgical aftercare 2019    Encounter for postoperative wound check    Gestational diabetes ([facility]) 08/10/2019    Personal history of gestational diabetes 2019    History of gestational diabetes mellitus (GDM)    Personal history of other specified conditions 2019    History of abnormal Pap smear       Past Surgical History  Past Surgical History:   Procedure Laterality Date    OTHER SURGICAL HISTORY  03/15/2019    Colposcopy    section     Social History  She reports that she has never smoked. She has never used smokeless tobacco. She reports that she does not drink alcohol and does not use drugs.    Family History  Mother    · Family history of Healthy adult  Father    · Family history of Healthy adult  Sister    · Family history of Healthy adult  Brother    · Family history of Healthy adult     Allergies  Patient has no known allergies.    Review of Systems    Constitutional: Negative.    HENT: Negative.     Eyes: Negative.    Respiratory: Negative.     Cardiovascular:         See HPI   Gastrointestinal:         See HPI   Endocrine: Negative.    Genitourinary: Negative.    Musculoskeletal: Negative.    Skin: Negative.    Allergic/Immunologic: Negative.    Neurological: Negative.    Hematological: Negative.    Psychiatric/Behavioral: Negative.          Physical Exam  Constitutional:       General: She is not in acute distress.     Appearance: Normal appearance. She is not ill-appearing, toxic-appearing or diaphoretic.   HENT:      Head: Normocephalic and atraumatic.      Nose: Nose normal.      Mouth/Throat:      Mouth: Mucous membranes are dry.      Pharynx: Oropharynx is clear. No oropharyngeal exudate or posterior oropharyngeal erythema.   Eyes:      General: No scleral icterus.        Right eye: No discharge.         Left eye: No discharge.      Conjunctiva/sclera: Conjunctivae normal.   Cardiovascular:      Rate and Rhythm: Normal rate and regular rhythm.      Heart sounds: No murmur heard.  Pulmonary:      Breath sounds: No wheezing, rhonchi or rales.   Abdominal:      Palpations: There is no mass.      Tenderness: There is abdominal tenderness. There is no right CVA tenderness, left CVA tenderness, guarding or rebound.      Hernia: No hernia is present.      Comments: Tender in RUQ.    Musculoskeletal:      Cervical back: Neck supple.      Right lower leg: No edema.      Left lower leg: No edema.   Lymphadenopathy:      Cervical: No cervical adenopathy.   Skin:     General: Skin is warm and dry.   Neurological:      General: No focal deficit present.      Mental Status: She is alert and oriented to person, place, and time.   Psychiatric:         Mood and Affect: Mood normal.         Behavior: Behavior normal.          Last Recorded Vitals  Blood pressure 130/84, pulse 74, temperature 35.7 °C (96.3 °F), temperature source Temporal, resp. rate 18, height 1.702 m (5'  "7.01\"), weight 97.1 kg (214 lb 1.1 oz), last menstrual period 12/12/2023, SpO2 96%, currently breastfeeding.    Relevant Results   Latest Reference Range & Units 10/17/24 05:46 10/17/24 06:24 10/17/24 06:52   GLUCOSE 74 - 99 mg/dL 124 (H)     SODIUM 136 - 145 mmol/L 140     POTASSIUM 3.5 - 5.3 mmol/L 3.7     CHLORIDE 98 - 107 mmol/L 102     Bicarbonate 21 - 32 mmol/L 29     Anion Gap 10 - 20 mmol/L 13     Blood Urea Nitrogen 6 - 23 mg/dL 10     Creatinine 0.50 - 1.05 mg/dL 0.82     EGFR >60 mL/min/1.73m*2 >90     Calcium 8.6 - 10.3 mg/dL 9.1     Albumin 3.4 - 5.0 g/dL 4.3     Alkaline Phosphatase 33 - 110 U/L 135 (H)     ALT 7 - 45 U/L 184 (H)     AST 9 - 39 U/L 290 (H)     Bilirubin Total 0.0 - 1.2 mg/dL 1.1     Total Protein 6.4 - 8.2 g/dL 7.6     LDH 84 - 246 U/L   397 (H)   BNP 0 - 99 pg/mL 29     Troponin I, High Sensitivity 0 - 13 ng/L <3  <3   D-Dimer, Quantitative VTE Exclusion <=500 ng/mL FEU 1,095 (H)     WBC 4.4 - 11.3 x10*3/uL 9.9     nRBC 0.0 - 0.0 /100 WBCs 0.0     RBC 4.00 - 5.20 x10*6/uL 4.98     HEMOGLOBIN 12.0 - 16.0 g/dL 14.0     HEMATOCRIT 36.0 - 46.0 % 43.8     MCV 80 - 100 fL 88     MCH 26.0 - 34.0 pg 28.1     MCHC 32.0 - 36.0 g/dL 32.0     RED CELL DISTRIBUTION WIDTH 11.5 - 14.5 % 13.6     Platelets 150 - 450 x10*3/uL 342     Neutrophils % 40.0 - 80.0 % 79.8     Immature Granulocytes %, Automated 0.0 - 0.9 % 0.2     Lymphocytes % 13.0 - 44.0 % 14.2     Monocytes % 2.0 - 10.0 % 5.4     Eosinophils % 0.0 - 6.0 % 0.2     Basophils % 0.0 - 2.0 % 0.2     Neutrophils Absolute 1.20 - 7.70 x10*3/uL 7.90 (H)     Immature Granulocytes Absolute, Automated 0.00 - 0.70 x10*3/uL 0.02     Lymphocytes Absolute 1.20 - 4.80 x10*3/uL 1.41     Monocytes Absolute 0.10 - 1.00 x10*3/uL 0.53     Eosinophils Absolute 0.00 - 0.70 x10*3/uL 0.02     Basophils Absolute 0.00 - 0.10 x10*3/uL 0.02     Color, Urine Light-Yellow, Yellow, Dark-Yellow   Light-Yellow    Appearance, Urine Clear   Clear    Specific Gravity, Urine " 1.005 - 1.035   1.007    pH, Urine 5.0, 5.5, 6.0, 6.5, 7.0, 7.5, 8.0   8.0    Protein, Urine NEGATIVE, 10 (TRACE), 20 (TRACE) mg/dL  NEGATIVE    Glucose, Urine Normal mg/dL  Normal    Blood, Urine NEGATIVE   0.06 (1+) !    Ketones, Urine NEGATIVE mg/dL  NEGATIVE    Bilirubin, Urine NEGATIVE   NEGATIVE    Urobilinogen, Urine Normal mg/dL  Normal    Nitrite, Urine NEGATIVE   NEGATIVE    Leukocyte Esterase, Urine NEGATIVE   250 Biju/µL !    Acetaminophen 10.0 - 30.0 ug/mL   <10.0   Salicylate  4 - 20 mg/dL   <3   Alcohol <=10 mg/dL   <10   Squamous Epithelial Cells, Urine Reference range not established. /HPF  1-9 (SPARSE)    Bacteria, Urine NONE SEEN /HPF  1+ !    RBC, Urine NONE, 1-2, 3-5 /HPF  1-2    WBC, Urine 1-5, NONE /HPF  6-10 !    HCG, Urine NEGATIVE   NEGATIVE    (H): Data is abnormally high  !: Data is abnormal    XR CHEST:    IMPRESSION:  Normal single view chest examination. No evidence of infiltrate or  focal abnormality. No pneumothorax. No rib fracture identified.    CTA CHEST/ABD/PELVIS:     IMPRESSION:  1. No pulmonary embolism or acute aortic pathology.  2. Cholelithiasis with minimal gallbladder wall thickening.  Findings  may be secondary to cholecystitis.  Consider further evaluation with a  HIDA scan.  3. Mildly prominent common bile duct measuring up to 1 cm. Consider  further evaluation with MRCP.  4. Mild hepatic steatosis.  5. Tiny micronodules in the lung bases measuring up to 3 to 4 mm in  the left lower lobe.  Pulmonary nodule recommendation: No follow-up needed if patient is  low-risk (and has no known or suspected primary neoplasm).  Non-contrast chest CT can be considered in 12 months if patient is  high-risk (Per Fleischner Society guidelines).    US GALLBLADDER:      IMPRESSION:  Cholelithiasis.  No gallbladder wall thickening is appreciated.  Dilatation of the common bile duct measuring 0.9 cm. Could consider  further evaluation with MRCP.    MRCP PANCREAS:     IMPRESSION:  1.   Potential periportal edema which could be related to normal  variation, systemic hypervolemia, passive hepatic congestion, hepatic  trauma, hepatitis or cholangitis in the appropriate clinical setting.  Recommend correlation with symptomatology and liver function tests.  2. Cholecystic edema or minimal fluid without other definite evidence  of cholecystitis favoring secondary edema although primary  cholecystitis not excluded in the appropriate clinical setting.  3. Extrahepatic bile ducts are normal caliber.  4. Cholelithiasis.     Assessment/Plan   Cholelithiasis with biliary colic  As she is no longer having pain, it is likely that she has passed the offending stone  Observation to medical floor  General surgery consulted for Lap nini  In the meantime, NPO, IVF and pain control as needed    Abnormal LFTs  ,  and   Likely related to #1  Will repeat LFTs in am.     Question of UTI  UA shows 6-10 wbc/hpf and LE  Urine culture  Macrobid    I spent 75 minutes in the professional and overall care of this patient.      Aysha Mehta MD

## 2024-10-18 NOTE — ANESTHESIA PREPROCEDURE EVALUATION
Patient: Janett Ray    Procedure Information       Date/Time: 10/18/24 1300    Procedure: Cholecystectomy Laparoscopy    Location: [facility] / [facility]    Surgeons: Aurora Villavicencio MD          Denies nausea and vomiting     Relevant Problems   Anesthesia (within normal limits)      Cardiac   (+) Gestational hypertension without significant proteinuria, postpartum ([facility])      Liver   (+) Cholelithiasis with biliary obstruction       Clinical information reviewed:   Tobacco  Allergies  Meds  Problems  Med Hx  Surg Hx   Fam Hx  Soc   Hx        NPO Detail:  NPO/Void Status  Date of Last Liquid: 10/17/24  Date of Last Solid: 10/17/24         Physical Exam    Airway  Mallampati: II  TM distance: >3 FB  Neck ROM: full     Cardiovascular - normal exam     Dental - normal exam     Pulmonary - normal exam     Abdominal   (+) obese             Anesthesia Plan    History of general anesthesia?: no  History of complications of general anesthesia?: no    ASA 2 - emergent     general     intravenous induction   Postoperative administration of opioids is intended.  Trial extubation is planned.  Anesthetic plan and risks discussed with patient.  Use of blood products discussed with patient who consented to blood products.    Plan discussed with CRNA and attending.

## 2024-10-18 NOTE — CONSULTS
Reason For Consult  Cholecystitis     History Of Present Illness  Janett Ray is a 44 y.o. female presenting with abd pain.     Pt states she started with epigastric abd pain 2 d ago. Pain was sharp and radiating to her back and shoulder blades. She then had nausea and vomiting.  Her sx lasted through yesterday. She had decreased appetite but did eat something yesterday though it caused worse abd pain. She came to [facility] yesterday for evaluation for her continued symptoms.   She was evaluated and US gallbladder revealed  cholelithiasis, dilatation of common bile duct. MRCP was then completed which revealed Potential periportal edema, Cholecystic edema or minimal fluid without other definite evidence  of cholecystitis, Extrahepatic bile ducts are normal, Cholelithiasis.    She was then transferred to [facility] for further management.     Since receiving IV pain medicine she denies pain. No n/v now.   No fever or chills.   No CP or SOB sx.     She had a baby 35 days ago and is breast feeding currently. She denies depression since delivery of baby. She states she does not want to get pregnant again and is interested in having her tubes tied with GYN but has not yet made an appointment.     Non smoker. No DM. No lung disease.     Past Medical History  She has a past medical history of Abnormal glucose complicating pregnancy ([facility]), Encounter for full-term uncomplicated delivery (09/12/2019), Encounter for other specified surgical aftercare (11/25/2019), Gestational diabetes (08/10/2019), Personal history of gestational diabetes (12/23/2019), and Personal history of other specified conditions (09/12/2019).    Surgical History  She has a past surgical history that includes Other surgical history (03/15/2019).     Social History  She reports that she has never smoked. She has never used smokeless tobacco. She reports that she does not drink alcohol and does not use drugs.    Family  History  No family history on file.     Allergies  Patient has no known allergies.    Review of Systems  Review of Systems   Constitutional:  Positive for appetite change. Negative for activity change.   Respiratory:  Negative for cough, chest tightness, shortness of breath and wheezing.    Cardiovascular:  Negative for chest pain, palpitations and leg swelling.   Gastrointestinal:  Positive for abdominal pain. Negative for constipation, diarrhea, nausea (resolved now) and vomiting (resolved now).   Genitourinary:  Negative for difficulty urinating, vaginal bleeding, vaginal discharge and vaginal pain.   Psychiatric/Behavioral:  Negative for behavioral problems, dysphoric mood, self-injury and suicidal ideas.           Physical Exam  Physical Exam  Vitals reviewed.   Constitutional:       General: She is not in acute distress.     Appearance: Normal appearance. She is normal weight. She is not ill-appearing, toxic-appearing or diaphoretic.   HENT:      Head: Normocephalic and atraumatic.      Mouth/Throat:      Mouth: Mucous membranes are moist.   Eyes:      General: No scleral icterus.        Right eye: No discharge.         Left eye: No discharge.      Conjunctiva/sclera: Conjunctivae normal.   Cardiovascular:      Rate and Rhythm: Normal rate and regular rhythm.      Pulses: Normal pulses.      Heart sounds: Normal heart sounds. No murmur heard.     No friction rub. No gallop.   Pulmonary:      Effort: Pulmonary effort is normal. No respiratory distress.      Breath sounds: Normal breath sounds. No stridor. No wheezing, rhonchi or rales.   Chest:      Chest wall: No tenderness.   Abdominal:      General: Bowel sounds are normal. There is no distension.      Palpations: Abdomen is soft. There is no mass.      Tenderness: There is no abdominal tenderness. There is no guarding or rebound.      Hernia: No hernia is present.   Musculoskeletal:      Right lower leg: No edema.      Left lower leg: No edema.   Skin:      "General: Skin is warm and dry.      Capillary Refill: Capillary refill takes less than 2 seconds.   Neurological:      Mental Status: She is alert and oriented to person, place, and time.   Psychiatric:         Mood and Affect: Mood normal.         Behavior: Behavior normal.         Thought Content: Thought content normal.         Judgment: Judgment normal.            Last Recorded Vitals  Blood pressure 116/76, pulse 78, temperature 37 °C (98.6 °F), temperature source Temporal, resp. rate 16, height 1.702 m (5' 7.01\"), weight 97.1 kg (214 lb 1.1 oz), last menstrual period 12/12/2023, SpO2 100%, currently breastfeeding.    Relevant Results    MRCP pancreas w and wo IV contrast    Result Date: 10/17/2024  Interpreted By:  Shiv Roman, STUDY: MRCP PANCREAS W AND WO IV CONTRAST;  10/17/2024 2:19 pm   INDICATION: Signs/Symptoms:concern for choledocolithiasis.     COMPARISON: CT chest abdomen and pelvis and gallbladder ultrasound   ACCESSION NUMBER(S): [id number]   ORDERING CLINICIAN: TRANG MANNING   TECHNIQUE: MRI PANCREAS; Multiplanar magnetic resonance images of the abdomen were obtained including the following sequences; T2-weighted SSFSE with and without fat saturation, T1-weighted GRE in/opposed phase, DWI, fat saturated 3D-T1w GRE pre and dynamically post contrast. Radial thick slab T2w RARE MRCP and coronally reconstructed navigator gated high resolution 3-D T2w RESTORE MRCP with MIP reconstruction were also performed for MRCP.  19 ML of Dotarem was administered intravenously without immediate complication.   FINDINGS: LIVER: Normal size and morphology. Circumferential periportal T2 hyperintensity is potentially indicating edema as noted on image 7/20. Normal enhancement. Hepatic parenchyma is isointense on T1 in and out of phase sequences.  No liver mass.   BILE DUCTS: No intrahepatic or extrahepatic bile duct dilatation is demonstrated. The common bile duct measures 5 mm caliber image 3/16 with " smooth distal tapering without evident obstructing mass or calculus.   GALLBLADDER: Nondistended containing multiple gallstones. The gallbladder wall demonstrates normal caliber. There is pericholecystic edema or minimal fluid.   PANCREAS: Normal signal intensity. Normal enhancement. No masses. The pancreatic duct is normal.   SPLEEN: Within normal limits.   ADRENAL GLANDS: Within normal limits.   KIDNEYS: Within normal limits.   LYMPH NODES: No adenopathy   ABDOMINAL VESSELS: Aorta and the major abdominal arterial vessels demonstrate no gross abnormality.  Superior mesenteric vein, splenic vein, and main, right and left portal vein are patent. Hepatic veins are patent.  No significant collaterals or esophageal varices are present.   BOWEL: The stomach and visualized bowel are nondilated without wall thickening.   PERITONEUM/RETROPERITONEUM: No significant abnormality.   BONES AND LOWER THORAX: No abnormally enhancing focal bony lesions. The visualized lower lung fields are unremarkable. The heart is normal in size.       1.  Potential periportal edema which could be related to normal variation, systemic hypervolemia, passive hepatic congestion, hepatic trauma, hepatitis or cholangitis in the appropriate clinical setting. Recommend correlation with symptomatology and liver function tests. 2. Cholecystic edema or minimal fluid without other definite evidence of cholecystitis favoring secondary edema although primary cholecystitis not excluded in the appropriate clinical setting. 3. Extrahepatic bile ducts are normal caliber. 4. Cholelithiasis.     MACRO: None   Signed by: Shiv Roman 10/17/2024 2:52 PM Dictation workstation:   [id number]    ECG 12 lead    Result Date: 10/17/2024  Normal sinus rhythm Normal ECG No previous ECGs available    CT angio chest for pulmonary embolism    Result Date: 10/17/2024  STUDY: CT Angiogram of the Chest, CT Abdomen and Pelvis with IV Contrast; 10/17/2024 7:46 AM INDICATION:  Chest pain.  Right upper quadrant pain. COMPARISON: CXR 10/17/2024. ACCESSION NUMBER(S): [id number], [id number] ORDERING CLINICIAN: TRANG MANNING TECHNIQUE:  CTA of the chest was performed following rapid injection of intravenous contrast.  Images are reviewed and processed at a workstation according to the CT angiogram protocol with 3-D and/or MIP post processing imaging generated.  CT of the abdomen and pelvis was performed with intravenous contrast.  Omnipaque 350 75 mL was administered intravenously; positive oral contrast was given. Automated mA/kV exposure control was utilized and patient examination was performed in strict accordance with principles of ALARA. FINDINGS:  CTA CHEST: Pulmonary arteries are adequately opacified without acute or chronic filling defects.  The thoracic aorta is normal in course and caliber without dissection or aneurysm. The heart is normal in size without pericardial effusion.  Thoracic lymph nodes are not enlarged. There is no pleural effusion, pleural thickening, or pneumothorax. The airways are patent. Tiny micronodules in the lung bases measuring up to 3 to 4 mm in the left lower lobe. ABDOMEN:  LIVER: No hepatomegaly.  Smooth surface contour.  Mild fatty infiltration of the liver  BILE DUCTS: Common bile duct is mildly prominent measuring up to 1 cm  GALLBLADDER: The gallbladder is present with underlying gallstones and minimal gallbladder wall thickening..  STOMACH: No abnormalities identified.  PANCREAS: No masses or ductal dilatation.  SPLEEN: No splenomegaly or focal splenic lesion.  ADRENAL GLANDS: No thickening or nodules.  KIDNEYS AND URETERS: Kidneys are normal in size and location.  No renal or ureteral calculi.  PELVIS:  BLADDER: No abnormalities identified.  REPRODUCTIVE ORGANS: No abnormalities identified.  BOWEL: No abnormalities identified.  VESSELS: No abnormalities identified.  Abdominal aorta is normal in caliber.  PERITONEUM/RETROPERITONEUM/LYMPH  NODES: No free fluid.  No pneumoperitoneum. No lymphadenopathy.  ABDOMINAL WALL: No abnormalities identified. SOFT TISSUES: No abnormalities identified.  BONES: No acute fracture or aggressive osseous lesion.    1. No pulmonary embolism or acute aortic pathology. 2. Cholelithiasis with minimal gallbladder wall thickening.  Findings may be secondary to cholecystitis.  Consider further evaluation with a HIDA scan. 3. Mildly prominent common bile duct measuring up to 1 cm. Consider further evaluation with MRCP. 4. Mild hepatic steatosis. 5. Tiny micronodules in the lung bases measuring up to 3 to 4 mm in the left lower lobe. Pulmonary nodule recommendation: No follow-up needed if patient is low-risk (and has no known or suspected primary neoplasm). Non-contrast chest CT can be considered in 12 months if patient is high-risk (Per Fleischner Society guidelines). Signed by Keven Jones MD    CT abdomen pelvis w IV contrast    Result Date: 10/17/2024  STUDY: CT Angiogram of the Chest, CT Abdomen and Pelvis with IV Contrast; 10/17/2024 7:46 AM INDICATION: Chest pain.  Right upper quadrant pain. COMPARISON: CXR 10/17/2024. ACCESSION NUMBER(S): [id number], [id number] ORDERING CLINICIAN: TRANG MANNING TECHNIQUE:  CTA of the chest was performed following rapid injection of intravenous contrast.  Images are reviewed and processed at a workstation according to the CT angiogram protocol with 3-D and/or MIP post processing imaging generated.  CT of the abdomen and pelvis was performed with intravenous contrast.  Omnipaque 350 75 mL was administered intravenously; positive oral contrast was given. Automated mA/kV exposure control was utilized and patient examination was performed in strict accordance with principles of ALARA. FINDINGS:  CTA CHEST: Pulmonary arteries are adequately opacified without acute or chronic filling defects.  The thoracic aorta is normal in course and caliber without dissection or aneurysm. The  heart is normal in size without pericardial effusion.  Thoracic lymph nodes are not enlarged. There is no pleural effusion, pleural thickening, or pneumothorax. The airways are patent. Tiny micronodules in the lung bases measuring up to 3 to 4 mm in the left lower lobe. ABDOMEN:  LIVER: No hepatomegaly.  Smooth surface contour.  Mild fatty infiltration of the liver  BILE DUCTS: Common bile duct is mildly prominent measuring up to 1 cm  GALLBLADDER: The gallbladder is present with underlying gallstones and minimal gallbladder wall thickening..  STOMACH: No abnormalities identified.  PANCREAS: No masses or ductal dilatation.  SPLEEN: No splenomegaly or focal splenic lesion.  ADRENAL GLANDS: No thickening or nodules.  KIDNEYS AND URETERS: Kidneys are normal in size and location.  No renal or ureteral calculi.  PELVIS:  BLADDER: No abnormalities identified.  REPRODUCTIVE ORGANS: No abnormalities identified.  BOWEL: No abnormalities identified.  VESSELS: No abnormalities identified.  Abdominal aorta is normal in caliber.  PERITONEUM/RETROPERITONEUM/LYMPH NODES: No free fluid.  No pneumoperitoneum. No lymphadenopathy.  ABDOMINAL WALL: No abnormalities identified. SOFT TISSUES: No abnormalities identified.  BONES: No acute fracture or aggressive osseous lesion.    1. No pulmonary embolism or acute aortic pathology. 2. Cholelithiasis with minimal gallbladder wall thickening.  Findings may be secondary to cholecystitis.  Consider further evaluation with a HIDA scan. 3. Mildly prominent common bile duct measuring up to 1 cm. Consider further evaluation with MRCP. 4. Mild hepatic steatosis. 5. Tiny micronodules in the lung bases measuring up to 3 to 4 mm in the left lower lobe. Pulmonary nodule recommendation: No follow-up needed if patient is low-risk (and has no known or suspected primary neoplasm). Non-contrast chest CT can be considered in 12 months if patient is high-risk (Per Fleischner Society guidelines). Signed by  Keven Jones MD    US gallbladder    Result Date: 10/17/2024  STUDY: Right Upper Quadrant Ultrasound; 10/17/2024 7:56 AM INDICATION: Elevated LFT.  Epigastric pain radiating to the back. COMPARISON: None Available. ACCESSION NUMBER(S): [id number] ORDERING CLINICIAN: TRANG MANNING TECHNIQUE: Ultrasound of the Right Upper Quadrant. FINDINGS: LIVER: The liver demonstrates normal echogenicity.   GALLBLADDER: The gallbladder contains multiple stones.  There is no pericholecystic fluid or wall thickening.  Sonographic Mckeon's sign is negative.    BILE DUCTS: The common bile duct measures 0.9 cm.  There is no intrahepatic biliary dilatation.   PANCREAS: The pancreas demonstrates a normal homogeneous echotexture with the tail not well seen due to overlying bowel gas.  RIGHT KIDNEY: The right kidney measures 10.7 cm in length.  Renal cortical echotexture is normal.  There is no hydronephrosis.  There are no stones.  There are no cysts.    Cholelithiasis.  No gallbladder wall thickening is appreciated. Dilatation of the common bile duct measuring 0.9 cm. Could consider further evaluation with MRCP. Signed by Keven Jones MD    XR chest 1 view    Result Date: 10/17/2024  STUDY: Chest Radiograph;  10/17/2024 5:57 AM INDICATION: Chest pain. COMPARISON: None Available ACCESSION NUMBER(S): [id number] ORDERING CLINICIAN: ARIE GAMBOA TECHNIQUE:  Frontal chest was obtained at 5:57 hours. FINDINGS: CARDIOMEDIASTINAL SILHOUETTE: Heart size normal. Airway normal. Mediastinum normal no widening. Hilar areas normal. No paraspinous density  LUNGS: Lungs are clear. No infiltrate. No pneumothorax. No effusion. No focal pulmonary parenchymal nodular opacity identified.  ABDOMEN: No remarkable upper abdominal findings. No free air.  BONES: No acute osseous changes. No evidence of rib fracture or periosteal reaction. Minimal thoracic spine degenerative changes.    Normal single view chest examination. No evidence of infiltrate  or focal abnormality. No pneumothorax. No rib fracture identified. Signed by Vinh Kilgore MD    Scheduled medications  ceFAZolin, 2 g, intravenous, Once  enoxaparin, 40 mg, subcutaneous, q24h  indocyanine green, 2.5 mg, intravenous, Once  prenatal vitamin (iron-folic), 1 tablet, oral, Daily      Continuous medications  D5 % and 0.9 % sodium chloride, 75 mL/hr, Last Rate: 75 mL/hr (10/18/24 0633)      PRN medications  PRN medications: acetaminophen **OR** acetaminophen **OR** acetaminophen, alum-mag hydroxide-simeth, calcium carbonate, dextromethorphan-guaifenesin, guaiFENesin, morphine, ondansetron **OR** ondansetron  Results for orders placed or performed during the hospital encounter of 10/18/24 (from the past 24 hours)   CBC   Result Value Ref Range    WBC 5.8 4.4 - 11.3 x10*3/uL    nRBC 0.0 0.0 - 0.0 /100 WBCs    RBC 4.84 4.00 - 5.20 x10*6/uL    Hemoglobin 13.7 12.0 - 16.0 g/dL    Hematocrit 42.6 36.0 - 46.0 %    MCV 88 80 - 100 fL    MCH 28.3 26.0 - 34.0 pg    MCHC 32.2 32.0 - 36.0 g/dL    RDW 13.8 11.5 - 14.5 %    Platelets 283 150 - 450 x10*3/uL        Assessment/Plan     This is a 44 year old female with cholelithiasis and biliary colic. Dr. Villavicencio will plan to take the patient to OR today for laparoscopic cholecystectomy.  - imaging reviewed.  - labs reviewed. Elevated LFTs. These were elevated at time of delivery of recent baby, may have been still related to cholelithiasis. Recommend repeat cmp in 1 month to ensure these are improving. This can be done at any out patient lab, order placed.   - will get indocyanine green 2.5 mg IV intraoperatively.  - ance 2 g just prior to surgery.  - NPO.  - IVF D 5 NS at 75 ml / hour for hydration while NPO.  - pain medicine per primary.  - zofran 4 mg IV PRN nausea.  - activity as tolerated.  - pt will need to follow up with Dr. Villavicencio in 2 weeks post operatively. No lifting over 10 lb, infant is 11 lb now. Recommend pump and dump breast milk for 24 hours after  surgery. Can use OTC tylenol and ibuprofen for pain postoperatively and may breast feed with these medicines.    - pt may need to stay overnight again tonight depending on how she is doing after anesthesia.     Contraception management  - recommend follow up with GYN to discuss her desire to have tubal ligation, she will be needing post-partum appointment as well and these can be completed at the same time. Phone number on discharge papers.     Pt seen with Dr. Villavicencio.     Yaima Smalls, RAY-CNP

## 2024-10-18 NOTE — CARE PLAN
Uneventful night. Pt rested comfortably. Minimal c/o pain this shift. Pt NPO for surgical consult this morning. Pt continues to progress towards meeting goals. VSS. Will continue to monitor.

## 2024-10-19 VITALS
RESPIRATION RATE: 15 BRPM | BODY MASS INDEX: 33.6 KG/M2 | TEMPERATURE: 97.7 F | WEIGHT: 214.07 LBS | OXYGEN SATURATION: 96 % | DIASTOLIC BLOOD PRESSURE: 90 MMHG | SYSTOLIC BLOOD PRESSURE: 146 MMHG | HEART RATE: 70 BPM | HEIGHT: 67 IN

## 2024-10-19 LAB
ALBUMIN SERPL BCP-MCNC: 3.6 G/DL (ref 3.4–5)
ALP SERPL-CCNC: 229 U/L (ref 33–110)
ALT SERPL W P-5'-P-CCNC: 382 U/L (ref 7–45)
ANION GAP SERPL CALC-SCNC: 12 MMOL/L (ref 10–20)
AST SERPL W P-5'-P-CCNC: 152 U/L (ref 9–39)
BILIRUB SERPL-MCNC: 0.5 MG/DL (ref 0–1.2)
BUN SERPL-MCNC: 7 MG/DL (ref 6–23)
CALCIUM SERPL-MCNC: 8.7 MG/DL (ref 8.6–10.3)
CHLORIDE SERPL-SCNC: 104 MMOL/L (ref 98–107)
CO2 SERPL-SCNC: 27 MMOL/L (ref 21–32)
CREAT SERPL-MCNC: 0.79 MG/DL (ref 0.5–1.05)
EGFRCR SERPLBLD CKD-EPI 2021: >90 ML/MIN/1.73M*2
ERYTHROCYTE [DISTWIDTH] IN BLOOD BY AUTOMATED COUNT: 13.6 % (ref 11.5–14.5)
GLUCOSE SERPL-MCNC: 101 MG/DL (ref 74–99)
HCT VFR BLD AUTO: 42.6 % (ref 36–46)
HGB BLD-MCNC: 13.5 G/DL (ref 12–16)
HOLD SPECIMEN: NORMAL
MAGNESIUM SERPL-MCNC: 1.78 MG/DL (ref 1.6–2.4)
MCH RBC QN AUTO: 28.1 PG (ref 26–34)
MCHC RBC AUTO-ENTMCNC: 31.7 G/DL (ref 32–36)
MCV RBC AUTO: 89 FL (ref 80–100)
NRBC BLD-RTO: 0 /100 WBCS (ref 0–0)
PHOSPHATE SERPL-MCNC: 3.6 MG/DL (ref 2.5–4.9)
PLATELET # BLD AUTO: 289 X10*3/UL (ref 150–450)
POTASSIUM SERPL-SCNC: 4.1 MMOL/L (ref 3.5–5.3)
PROT SERPL-MCNC: 6.6 G/DL (ref 6.4–8.2)
RBC # BLD AUTO: 4.81 X10*6/UL (ref 4–5.2)
SODIUM SERPL-SCNC: 139 MMOL/L (ref 136–145)
WBC # BLD AUTO: 9.2 X10*3/UL (ref 4.4–11.3)

## 2024-10-19 PROCEDURE — 2500000004 HC RX 250 GENERAL PHARMACY W/ HCPCS (ALT 636 FOR OP/ED): Performed by: SURGERY

## 2024-10-19 PROCEDURE — 80053 COMPREHEN METABOLIC PANEL: CPT | Performed by: SURGERY

## 2024-10-19 PROCEDURE — 2500000001 HC RX 250 WO HCPCS SELF ADMINISTERED DRUGS (ALT 637 FOR MEDICARE OP): Performed by: INTERNAL MEDICINE

## 2024-10-19 PROCEDURE — 2500000002 HC RX 250 W HCPCS SELF ADMINISTERED DRUGS (ALT 637 FOR MEDICARE OP, ALT 636 FOR OP/ED): Performed by: SURGERY

## 2024-10-19 PROCEDURE — 84100 ASSAY OF PHOSPHORUS: CPT | Performed by: SURGERY

## 2024-10-19 PROCEDURE — 2500000001 HC RX 250 WO HCPCS SELF ADMINISTERED DRUGS (ALT 637 FOR MEDICARE OP): Performed by: SURGERY

## 2024-10-19 PROCEDURE — 85027 COMPLETE CBC AUTOMATED: CPT | Performed by: SURGERY

## 2024-10-19 PROCEDURE — 94760 N-INVAS EAR/PLS OXIMETRY 1: CPT

## 2024-10-19 PROCEDURE — 83735 ASSAY OF MAGNESIUM: CPT | Performed by: SURGERY

## 2024-10-19 PROCEDURE — 96372 THER/PROPH/DIAG INJ SC/IM: CPT | Performed by: SURGERY

## 2024-10-19 PROCEDURE — 99238 HOSP IP/OBS DSCHRG MGMT 30/<: CPT | Performed by: INTERNAL MEDICINE

## 2024-10-19 PROCEDURE — G0378 HOSPITAL OBSERVATION PER HR: HCPCS

## 2024-10-19 PROCEDURE — 36415 COLL VENOUS BLD VENIPUNCTURE: CPT | Performed by: SURGERY

## 2024-10-19 RX ORDER — IBUPROFEN 200 MG
400 TABLET ORAL EVERY 6 HOURS PRN
Qty: 32 TABLET | Refills: 0 | Status: SHIPPED | OUTPATIENT
Start: 2024-10-19 | End: 2024-10-23

## 2024-10-19 RX ORDER — ACETAMINOPHEN 500 MG
1000 TABLET ORAL EVERY 8 HOURS PRN
Qty: 30 TABLET | Refills: 0 | Status: SHIPPED | OUTPATIENT
Start: 2024-10-19

## 2024-10-19 RX ADMIN — ACETAMINOPHEN 650 MG: 325 TABLET ORAL at 14:50

## 2024-10-19 RX ADMIN — Medication 1 TABLET: at 08:03

## 2024-10-19 RX ADMIN — NITROFURANTOIN MONOHYDRATE/MACROCRYSTALS 100 MG: 25; 75 CAPSULE ORAL at 08:03

## 2024-10-19 RX ADMIN — ACETAMINOPHEN 650 MG: 325 TABLET ORAL at 08:02

## 2024-10-19 RX ADMIN — ENOXAPARIN SODIUM 40 MG: 40 INJECTION SUBCUTANEOUS at 08:03

## 2024-10-19 ASSESSMENT — COGNITIVE AND FUNCTIONAL STATUS - GENERAL
MOBILITY SCORE: 24
DAILY ACTIVITIY SCORE: 24

## 2024-10-19 ASSESSMENT — PAIN - FUNCTIONAL ASSESSMENT
PAIN_FUNCTIONAL_ASSESSMENT: 0-10

## 2024-10-19 ASSESSMENT — PAIN SCALES - GENERAL
PAINLEVEL_OUTOF10: 0 - NO PAIN
PAINLEVEL_OUTOF10: 3
PAINLEVEL_OUTOF10: 0 - NO PAIN
PAINLEVEL_OUTOF10: 2

## 2024-10-19 ASSESSMENT — PAIN DESCRIPTION - ORIENTATION: ORIENTATION: RIGHT

## 2024-10-19 ASSESSMENT — PAIN DESCRIPTION - LOCATION
LOCATION: ABDOMEN
LOCATION: ABDOMEN

## 2024-10-19 NOTE — ANESTHESIA POSTPROCEDURE EVALUATION
Patient: Janett Ray    Procedure Summary       Date: 10/18/24 Room / Location: [facility] OR 07 / Virtual [facility] OR    Anesthesia Start: 1345 Anesthesia Stop: 1521    Procedure: Cholecystectomy Laparoscopy Diagnosis:     Surgeons: Aurora Villavicencio MD Responsible Provider: Jazmin Nj MD    Anesthesia Type: general ASA Status: 2 - Emergent            Anesthesia Type: general    Vitals Value Taken Time   /86 10/18/24 1725   Temp 36.4 °C (97.5 °F) 10/18/24 1520   Pulse 68 10/18/24 1725   Resp 15 10/18/24 1725   SpO2 93 % 10/18/24 1725       Anesthesia Post Evaluation    Patient location during evaluation: PACU  Patient participation: complete - patient participated  Level of consciousness: awake and alert  Pain management: adequate  Multimodal analgesia pain management approach  Airway patency: patent  Cardiovascular status: acceptable  Respiratory status: acceptable  Hydration status: acceptable  Postoperative Nausea and Vomiting: none        There were no known notable events for this encounter.

## 2024-10-20 LAB
ATRIAL RATE: 90 BPM
BACTERIA UR CULT: NO GROWTH
P AXIS: 44 DEGREES
P OFFSET: 208 MS
P ONSET: 154 MS
PR INTERVAL: 138 MS
Q ONSET: 223 MS
QRS COUNT: 15 BEATS
QRS DURATION: 86 MS
QT INTERVAL: 364 MS
QTC CALCULATION(BAZETT): 445 MS
QTC FREDERICIA: 416 MS
R AXIS: 62 DEGREES
T AXIS: 45 DEGREES
T OFFSET: 405 MS
VENTRICULAR RATE: 90 BPM

## 2024-10-20 NOTE — DISCHARGE SUMMARY
Discharge Diagnosis  Cholelithiasis with biliary obstruction    Issues Requiring Follow-Up  Above    Discharge Meds     Medication List      CHANGE how you take these medications     acetaminophen 500 mg tablet; Commonly known as: Tylenol; Take 2 tablets   (1,000 mg) by mouth every 8 hours if needed for mild pain (1 - 3).; What   changed: when to take this   ibuprofen 200 mg tablet; Take 2 tablets (400 mg) by mouth every 6 hours   if needed for mild pain (1 - 3) for up to 4 days.; What changed: how much   to take, when to take this, reasons to take this     CONTINUE taking these medications     Prenatal Vitamin 27 mg iron- 800 mcg tablet; Generic drug: prenatal vit   no.124-iron-folic; Take one tablet by mouth daily       Test Results Pending At Discharge  Pending Labs       Order Current Status    Surgical Pathology Exam Collected (10/18/24 9397)            Hospital Course    44-year-old female presenting with abdominal pain patient went to the ER and was found to have cholelithiasis and dilated CBD.  She was transferred to [facility] where she received a CCY and laparoscopy.  IntraOp and postop course was uncomplicated.  Patient tolerated a diet after surgery and was without significant pain.  Patient is to follow-up with surgery.  Patient was stable for discharge.  Pertinent Physical Exam At Time of Discharge  Physical Exam    Outpatient Follow-Up  Future Appointments   Date Time Provider Department [place]   10/28/2024  3:00 PM Edwin Staley MD [id number] [facility]   11/6/2024  2:30 PM Aurora Villavicencio MD [id number] None       Spoke to Dr. Merritt and he is agreeable to [place].   Susan Castle MD

## 2024-10-28 ENCOUNTER — APPOINTMENT (OUTPATIENT)
Dept: OBSTETRICS AND GYNECOLOGY | Facility: CLINIC | Age: 44
End: 2024-10-28
Payer: COMMERCIAL

## 2024-10-28 VITALS
WEIGHT: 205.8 LBS | DIASTOLIC BLOOD PRESSURE: 88 MMHG | HEIGHT: 67 IN | SYSTOLIC BLOOD PRESSURE: 122 MMHG | BODY MASS INDEX: 32.3 KG/M2

## 2024-10-28 LAB
LABORATORY COMMENT REPORT: NORMAL
PATH REPORT.FINAL DX SPEC: NORMAL
PATH REPORT.GROSS SPEC: NORMAL
PATH REPORT.TOTAL CANCER: NORMAL

## 2024-10-28 PROCEDURE — 0503F POSTPARTUM CARE VISIT: CPT | Performed by: OBSTETRICS & GYNECOLOGY

## 2024-10-28 ASSESSMENT — EDINBURGH POSTNATAL DEPRESSION SCALE (EPDS)
TOTAL SCORE: 4
I HAVE FELT SCARED OR PANICKY FOR NO GOOD REASON: NO, NOT AT ALL
I HAVE FELT SAD OR MISERABLE: NO, NOT AT ALL
I HAVE LOOKED FORWARD WITH ENJOYMENT TO THINGS: RATHER LESS THAN I USED TO
THINGS HAVE BEEN GETTING ON TOP OF ME: NO, MOST OF THE TIME I HAVE COPED QUITE WELL
THE THOUGHT OF HARMING MYSELF HAS OCCURRED TO ME: NEVER
I HAVE BEEN SO UNHAPPY THAT I HAVE HAD DIFFICULTY SLEEPING: NOT AT ALL
I HAVE BEEN ABLE TO LAUGH AND SEE THE FUNNY SIDE OF THINGS: AS MUCH AS I ALWAYS COULD
I HAVE BEEN SO UNHAPPY THAT I HAVE BEEN CRYING: NO, NEVER
I HAVE BLAMED MYSELF UNNECESSARILY WHEN THINGS WENT WRONG: YES, SOME OF THE TIME
I HAVE BEEN ANXIOUS OR WORRIED FOR NO GOOD REASON: NO, NOT AT ALL

## 2024-11-04 ENCOUNTER — APPOINTMENT (OUTPATIENT)
Dept: SURGERY | Facility: CLINIC | Age: 44
End: 2024-11-04
Payer: COMMERCIAL

## 2024-11-06 ENCOUNTER — APPOINTMENT (OUTPATIENT)
Facility: CLINIC | Age: 44
End: 2024-11-06
Payer: COMMERCIAL

## 2024-11-06 VITALS
HEIGHT: 67 IN | WEIGHT: 203.5 LBS | RESPIRATION RATE: 16 BRPM | OXYGEN SATURATION: 97 % | BODY MASS INDEX: 31.94 KG/M2 | TEMPERATURE: 98.2 F | HEART RATE: 78 BPM

## 2024-11-06 DIAGNOSIS — K80.21 CHOLELITHIASIS WITH BILIARY OBSTRUCTION: Primary | ICD-10-CM

## 2024-11-06 PROCEDURE — 1036F TOBACCO NON-USER: CPT | Performed by: SURGERY

## 2024-11-06 PROCEDURE — 99024 POSTOP FOLLOW-UP VISIT: CPT | Performed by: SURGERY

## 2024-11-06 PROCEDURE — 3008F BODY MASS INDEX DOCD: CPT | Performed by: SURGERY

## 2024-11-06 ASSESSMENT — PATIENT HEALTH QUESTIONNAIRE - PHQ9
2. FEELING DOWN, DEPRESSED OR HOPELESS: NOT AT ALL
SUM OF ALL RESPONSES TO PHQ9 QUESTIONS 1 AND 2: 0
1. LITTLE INTEREST OR PLEASURE IN DOING THINGS: NOT AT ALL

## 2024-11-06 ASSESSMENT — PAIN SCALES - GENERAL: PAINLEVEL_OUTOF10: 0-NO PAIN

## 2024-11-06 NOTE — PROGRESS NOTES
Subjective   Patient ID: Janett Ray is a 44 y.o. female who presents for Post-op Gallbladder (3 week po Lap Samantha. Patient states she is feeling good. ).  HPI  This is a pleasant patient who had a laparoscopic cholecystectomy several weeks ago she is doing well eating moving her bowels she is back to taking care of her baby and her 4-year-old and has no issues or complaints at this time.  Review of Systems  10 point review is otherwise negative  Objective   Physical Exam HEENT exam is unremarkable lungs are clear abdomen is flat soft and completely nontender.  Incisions are clean and dry.    Assessment/Plan the pathology just revealed cholelithiasis and cholecystitis nothing else of concern and she can retuned return to usual activities etc. and follow-up with me as needed.           Aurora Villavicencio MD 11/06/24 4:05 PM

## 2024-11-13 ENCOUNTER — TELEPHONE (OUTPATIENT)
Dept: OBSTETRICS AND GYNECOLOGY | Facility: CLINIC | Age: 44
End: 2024-11-13
Payer: COMMERCIAL

## 2024-11-22 ENCOUNTER — APPOINTMENT (OUTPATIENT)
Dept: OBSTETRICS AND GYNECOLOGY | Facility: CLINIC | Age: 44
End: 2024-11-22
Payer: COMMERCIAL

## 2024-11-22 VITALS
HEIGHT: 67 IN | DIASTOLIC BLOOD PRESSURE: 86 MMHG | BODY MASS INDEX: 32.3 KG/M2 | WEIGHT: 205.8 LBS | SYSTOLIC BLOOD PRESSURE: 120 MMHG

## 2024-11-22 DIAGNOSIS — Z30.430 ENCOUNTER FOR IUD INSERTION: Primary | ICD-10-CM

## 2024-11-22 DIAGNOSIS — Z32.02 PREGNANCY TEST NEGATIVE: ICD-10-CM

## 2024-11-22 LAB — PREGNANCY TEST URINE, POC: NEGATIVE

## 2024-11-22 NOTE — PROGRESS NOTES
Janett Ray is a 44 y.o., , who presents for Mirena IUD insertion.  S/p .  No unprotected sex for >2 wks prior to procedure.    No F/C/V/N/V/D/HA/CP/SOB  10 point ROS negative except as listed above.       Objective   Allergies:   Patient has no known allergies.    OB hx:      Physical exam  LMP 2023 (Exact Date)        IUD Insertion    Performed by: Edwin Staley MD  Authorized by: Edwin Staley MD    Procedure: IUD insertion    Consent obtained by patient, parent, or legal power of  - including discussion of procedure risks and benefits, patient questions answered, and patient education provided: yes    Pregnancy risk: reasonably certain the patient is not pregnant    Immediately prior to procedure a time out was called: yes    Pelvic exam performed: yes    Speculum placed in vagina: yes    Cervix cleaned and prepped: yes    Tenaculum/Allis/Ring Forceps applied to cervix: yes    Anesthesia used: yes    Local anesthesia:  Intracervical  Local anesthetic:  Lidocaine  Anesthetic strength (%):  1  Volume (mL):  1  Cervix manually dilated: no    IUD inserted without complications: yes    Patient tolerated procedure well: yes    Estimated blood loss (mL):  1      Assessment/Plan  Mirena IUD inserted today.  Patient tolerated procedure well.  RTC in 2 weeks for IUD check.      Scribe Attestation  By signing my name below, IJunie, Scribe   attest that this documentation has been prepared under the direction and in the presence of Edwin Staley MD.

## 2024-11-29 PROBLEM — Z30.430 ENCOUNTER FOR IUD INSERTION: Status: ACTIVE | Noted: 2024-11-29

## 2025-01-31 ENCOUNTER — APPOINTMENT (OUTPATIENT)
Dept: OBSTETRICS AND GYNECOLOGY | Facility: CLINIC | Age: 45
End: 2025-01-31
Payer: COMMERCIAL

## 2025-01-31 VITALS
DIASTOLIC BLOOD PRESSURE: 72 MMHG | SYSTOLIC BLOOD PRESSURE: 110 MMHG | WEIGHT: 207 LBS | BODY MASS INDEX: 35.34 KG/M2 | HEIGHT: 64 IN

## 2025-01-31 DIAGNOSIS — Z30.431 IUD CHECK UP: Primary | ICD-10-CM

## 2025-01-31 PROCEDURE — 3008F BODY MASS INDEX DOCD: CPT | Performed by: OBSTETRICS & GYNECOLOGY

## 2025-01-31 PROCEDURE — 3074F SYST BP LT 130 MM HG: CPT | Performed by: OBSTETRICS & GYNECOLOGY

## 2025-01-31 PROCEDURE — 3078F DIAST BP <80 MM HG: CPT | Performed by: OBSTETRICS & GYNECOLOGY

## 2025-01-31 PROCEDURE — 99213 OFFICE O/P EST LOW 20 MIN: CPT | Performed by: OBSTETRICS & GYNECOLOGY

## 2025-01-31 RX ORDER — LEVONORGESTREL 52 MG/1
1 INTRAUTERINE DEVICE INTRAUTERINE ONCE
COMMUNITY

## 2025-01-31 NOTE — PROGRESS NOTES
"Janett Ray is a 44 y.o., , who presents for Mirena IUD check.  No acute concerns.  No pain or bleeding issues.     Mirena IUD inserted 2024    No F/C/V/N/V/D/HA/CP/SOB  10 point ROS negative except as listed above.       Objective     OB hx:      Physical exam  /72   Ht 1.626 m (5' 4\")   Wt 93.9 kg (207 lb)   BMI 35.53 kg/m²      Physical Exam  Constitutional:       Appearance: Normal appearance.   HENT:      Head: Normocephalic and atraumatic.   Eyes:      Extraocular Movements: Extraocular movements intact.      Conjunctiva/sclera: Conjunctivae normal.      Pupils: Pupils are equal, round, and reactive to light.   Pulmonary:      Effort: Pulmonary effort is normal.   Chest:   Breasts:     Right: Normal.      Left: Normal.   Abdominal:      General: Abdomen is flat.      Palpations: Abdomen is soft.   Genitourinary:     General: Normal vulva.      Vagina: Normal.      Cervix: Normal.      Uterus: Normal.       Adnexa: Right adnexa normal and left adnexa normal.      Comments: IUD strings visualized  Skin:     General: Skin is warm and dry.   Neurological:      General: No focal deficit present.      Mental Status: She is alert.   Psychiatric:         Mood and Affect: Mood normal.         Behavior: Behavior normal.         Thought Content: Thought content normal.         Judgment: Judgment normal.         Assessment/Plan  IUD check:  - IUD in place by exam, HPI. No issues.   - IUD strings visualized on exam.  - Offered TVUS, but patient declined.    Scribe Attestation  By signing my name below, Junie MIJARES, Scrjohn   attest that this documentation has been prepared under the direction and in the presence of Edwin Staley MD.    "

## (undated) DEVICE — CARE KIT, LAPAROSCOPIC, ADVANCED

## (undated) DEVICE — DRAPE PACK, LAPAROSCOPIC CHOLECYSTECTOMY, CUSTOM, GEAUGA

## (undated) DEVICE — SOLUTION, INJECTION, USP, SODIUM CHLORIDE 0.9%, .9, NACL, 1000 ML, BAG

## (undated) DEVICE — SUTURE, VICRYL, 4-0, 18 IN, PS2, UNDYED

## (undated) DEVICE — SCISSOR, MINI ENDO CUT, TIPS, DISP

## (undated) DEVICE — ACCESS SYS, KII SHIELDED BLADED, Z-THREAD, 12X100CM

## (undated) DEVICE — CAUTERY, PENCIL, PUSH BUTTON, SMOKE EVAC, 70MM

## (undated) DEVICE — DEVICE, VOYANT, 5MM X 37CM

## (undated) DEVICE — SUTURE, CAPROSYN, UNDYED, NON-NEEDLE PRODUCT, 60 IN

## (undated) DEVICE — RETRIEVAL SYSTEM, MONARCH, 10MM DISP ENDOSCOPIC

## (undated) DEVICE — SUTURE, ETHILON, 2-0, 18 IN, FS, BLACK, BX/12

## (undated) DEVICE — ELECTRODE, ELECTROSURGICAL, BLADE, E-Z CLEAN, 4 IN

## (undated) DEVICE — DRAPE, INSTRUMENT, W/POUCH, STERI DRAPE, 9 5/8 X 18 LONG

## (undated) DEVICE — CLIP, ENDO, CLINCH II, W/RATCHET, ON/OFF, CLINCH II, 5 MM

## (undated) DEVICE — CLIP, ENDO APPLIER LIGAMAX 5MM

## (undated) DEVICE — SUTURE, VICRYL, 0, 27 IN, UR-6, VIOLET

## (undated) DEVICE — TUBE SET, PNEUMOLAR HEATED, SMOKE EVACU, HIGH-FLOW

## (undated) DEVICE — LIGASURE, V SEALER/DIVIDER  5MM BLUNT TIP

## (undated) DEVICE — DRAPE, C-ARM IMAGE

## (undated) DEVICE — PREP TRAY, SKIN, DRY, W/GLOVES

## (undated) DEVICE — ELECTRODE, ELECTROSURGICAL, BLADE, INSULATED, ENT/IMA, STERILE

## (undated) DEVICE — ASSEMBLY, STRYKER FLOW 2, SUCTION IRRIGATOR, WITH TIP

## (undated) DEVICE — ACCESS SYS, KII SHIELDED BLADED, Z-THREAD, 5X100CM

## (undated) DEVICE — DISSECTOR, KITTNER, PEANUT, 5MM

## (undated) DEVICE — CABLE, ELECTROSURGICAL, MONOPOLAR, LAPAROSCOPIC, 10 FT, LF

## (undated) DEVICE — SYRINGE, HYPODERMIC, LUER LOCK, 6 CC

## (undated) DEVICE — ADHESIVE, SKIN, DERMABOND ADVANCED, 15CM, PEN-STYLE

## (undated) DEVICE — NEEDLE, INSUFFLATION, 13GAX120MM, DISP